# Patient Record
Sex: FEMALE | Race: OTHER | HISPANIC OR LATINO | URBAN - METROPOLITAN AREA
[De-identification: names, ages, dates, MRNs, and addresses within clinical notes are randomized per-mention and may not be internally consistent; named-entity substitution may affect disease eponyms.]

---

## 2021-08-10 ENCOUNTER — INPATIENT (INPATIENT)
Facility: HOSPITAL | Age: 31
LOS: 8 days | Discharge: HOME | DRG: 885 | End: 2021-08-19
Attending: PSYCHIATRY & NEUROLOGY | Admitting: PSYCHIATRY & NEUROLOGY
Payer: COMMERCIAL

## 2021-08-10 VITALS
RESPIRATION RATE: 18 BRPM | HEIGHT: 65 IN | TEMPERATURE: 98 F | SYSTOLIC BLOOD PRESSURE: 119 MMHG | DIASTOLIC BLOOD PRESSURE: 82 MMHG | OXYGEN SATURATION: 97 % | HEART RATE: 99 BPM | WEIGHT: 169.98 LBS

## 2021-08-10 DIAGNOSIS — F23 BRIEF PSYCHOTIC DISORDER: ICD-10-CM

## 2021-08-10 LAB
AMPHET UR-MCNC: NEGATIVE — SIGNIFICANT CHANGE UP
ANION GAP SERPL CALC-SCNC: 7 MMOL/L — SIGNIFICANT CHANGE UP (ref 5–17)
APAP SERPL-MCNC: <5 UG/ML — LOW (ref 10–30)
APPEARANCE UR: CLEAR — SIGNIFICANT CHANGE UP
BACTERIA # UR AUTO: PRESENT /HPF
BARBITURATES UR SCN-MCNC: NEGATIVE — SIGNIFICANT CHANGE UP
BASOPHILS # BLD AUTO: 0.04 K/UL — SIGNIFICANT CHANGE UP (ref 0–0.2)
BASOPHILS NFR BLD AUTO: 0.6 % — SIGNIFICANT CHANGE UP (ref 0–2)
BENZODIAZ UR-MCNC: NEGATIVE — SIGNIFICANT CHANGE UP
BILIRUB UR-MCNC: NEGATIVE — SIGNIFICANT CHANGE UP
BUN SERPL-MCNC: 6 MG/DL — LOW (ref 7–23)
CALCIUM SERPL-MCNC: 10 MG/DL — SIGNIFICANT CHANGE UP (ref 8.4–10.5)
CHLORIDE SERPL-SCNC: 101 MMOL/L — SIGNIFICANT CHANGE UP (ref 96–108)
CO2 SERPL-SCNC: 29 MMOL/L — SIGNIFICANT CHANGE UP (ref 22–31)
COCAINE METAB.OTHER UR-MCNC: NEGATIVE — SIGNIFICANT CHANGE UP
COLOR SPEC: YELLOW — SIGNIFICANT CHANGE UP
COVID-19 SPIKE DOMAIN AB INTERP: POSITIVE
COVID-19 SPIKE DOMAIN ANTIBODY RESULT: 213 U/ML — HIGH
CREAT SERPL-MCNC: 0.92 MG/DL — SIGNIFICANT CHANGE UP (ref 0.5–1.3)
DIFF PNL FLD: NEGATIVE — SIGNIFICANT CHANGE UP
EOSINOPHIL # BLD AUTO: 0.02 K/UL — SIGNIFICANT CHANGE UP (ref 0–0.5)
EOSINOPHIL NFR BLD AUTO: 0.3 % — SIGNIFICANT CHANGE UP (ref 0–6)
EPI CELLS # UR: ABNORMAL /HPF (ref 0–5)
ETHANOL SERPL-MCNC: <10 MG/DL — SIGNIFICANT CHANGE UP (ref 0–10)
GLUCOSE SERPL-MCNC: 109 MG/DL — HIGH (ref 70–99)
GLUCOSE UR QL: NEGATIVE — SIGNIFICANT CHANGE UP
HCT VFR BLD CALC: 44.5 % — SIGNIFICANT CHANGE UP (ref 34.5–45)
HGB BLD-MCNC: 15 G/DL — SIGNIFICANT CHANGE UP (ref 11.5–15.5)
IMM GRANULOCYTES NFR BLD AUTO: 0.1 % — SIGNIFICANT CHANGE UP (ref 0–1.5)
KETONES UR-MCNC: NEGATIVE — SIGNIFICANT CHANGE UP
LEUKOCYTE ESTERASE UR-ACNC: ABNORMAL
LYMPHOCYTES # BLD AUTO: 1.54 K/UL — SIGNIFICANT CHANGE UP (ref 1–3.3)
LYMPHOCYTES # BLD AUTO: 22.3 % — SIGNIFICANT CHANGE UP (ref 13–44)
MCHC RBC-ENTMCNC: 30.3 PG — SIGNIFICANT CHANGE UP (ref 27–34)
MCHC RBC-ENTMCNC: 33.7 GM/DL — SIGNIFICANT CHANGE UP (ref 32–36)
MCV RBC AUTO: 89.9 FL — SIGNIFICANT CHANGE UP (ref 80–100)
METHADONE UR-MCNC: NEGATIVE — SIGNIFICANT CHANGE UP
MONOCYTES # BLD AUTO: 0.68 K/UL — SIGNIFICANT CHANGE UP (ref 0–0.9)
MONOCYTES NFR BLD AUTO: 9.9 % — SIGNIFICANT CHANGE UP (ref 2–14)
NEUTROPHILS # BLD AUTO: 4.61 K/UL — SIGNIFICANT CHANGE UP (ref 1.8–7.4)
NEUTROPHILS NFR BLD AUTO: 66.8 % — SIGNIFICANT CHANGE UP (ref 43–77)
NITRITE UR-MCNC: NEGATIVE — SIGNIFICANT CHANGE UP
NRBC # BLD: 0 /100 WBCS — SIGNIFICANT CHANGE UP (ref 0–0)
OPIATES UR-MCNC: NEGATIVE — SIGNIFICANT CHANGE UP
PCP SPEC-MCNC: SIGNIFICANT CHANGE UP
PCP UR-MCNC: NEGATIVE — SIGNIFICANT CHANGE UP
PH UR: 6 — SIGNIFICANT CHANGE UP (ref 5–8)
PLATELET # BLD AUTO: 325 K/UL — SIGNIFICANT CHANGE UP (ref 150–400)
POTASSIUM SERPL-MCNC: 4.5 MMOL/L — SIGNIFICANT CHANGE UP (ref 3.5–5.3)
POTASSIUM SERPL-SCNC: 4.5 MMOL/L — SIGNIFICANT CHANGE UP (ref 3.5–5.3)
PROT UR-MCNC: NEGATIVE MG/DL — SIGNIFICANT CHANGE UP
RBC # BLD: 4.95 M/UL — SIGNIFICANT CHANGE UP (ref 3.8–5.2)
RBC # FLD: 12.2 % — SIGNIFICANT CHANGE UP (ref 10.3–14.5)
RBC CASTS # UR COMP ASSIST: < 5 /HPF — SIGNIFICANT CHANGE UP
SALICYLATES SERPL-MCNC: <0.3 MG/DL — LOW (ref 2.8–20)
SARS-COV-2 IGG+IGM SERPL QL IA: 213 U/ML — HIGH
SARS-COV-2 IGG+IGM SERPL QL IA: POSITIVE
SARS-COV-2 RNA SPEC QL NAA+PROBE: SIGNIFICANT CHANGE UP
SODIUM SERPL-SCNC: 137 MMOL/L — SIGNIFICANT CHANGE UP (ref 135–145)
SP GR SPEC: 1.01 — SIGNIFICANT CHANGE UP (ref 1–1.03)
THC UR QL: NEGATIVE — SIGNIFICANT CHANGE UP
UROBILINOGEN FLD QL: 0.2 E.U./DL — SIGNIFICANT CHANGE UP
WBC # BLD: 6.9 K/UL — SIGNIFICANT CHANGE UP (ref 3.8–10.5)
WBC # FLD AUTO: 6.9 K/UL — SIGNIFICANT CHANGE UP (ref 3.8–10.5)
WBC UR QL: < 5 /HPF — SIGNIFICANT CHANGE UP

## 2021-08-10 PROCEDURE — 93010 ELECTROCARDIOGRAM REPORT: CPT | Mod: NC

## 2021-08-10 PROCEDURE — 90792 PSYCH DIAG EVAL W/MED SRVCS: CPT

## 2021-08-10 PROCEDURE — 99285 EMERGENCY DEPT VISIT HI MDM: CPT

## 2021-08-10 RX ORDER — HALOPERIDOL DECANOATE 100 MG/ML
2 INJECTION INTRAMUSCULAR EVERY 6 HOURS
Refills: 0 | Status: DISCONTINUED | OUTPATIENT
Start: 2021-08-10 | End: 2021-08-19

## 2021-08-10 RX ADMIN — Medication 1 MILLIGRAM(S): at 10:59

## 2021-08-10 RX ADMIN — Medication 2 MILLIGRAM(S): at 18:13

## 2021-08-10 NOTE — ED PROVIDER NOTE - CLINICAL SUMMARY MEDICAL DECISION MAKING FREE TEXT BOX
32 y/o f with recent diagnosis of ?psychotic disorder presents with increased agitation, having adverse side effects of her medications.  No SI/HI in ED, medical w/u negative, seen by psych who will admit pending bed assignment.

## 2021-08-10 NOTE — ED BEHAVIORAL HEALTH ASSESSMENT NOTE - DESCRIPTION
patient was given 1mg lorazepam for agitation restlessness with good response none lives with significant other. no illicit drugs. no tobacco use. occasional alcohol use.

## 2021-08-10 NOTE — ED ADULT NURSE NOTE - OBJECTIVE STATEMENT
32 y/o female presents to ED for psychiatric evaluation - reports she has history of 30 y/o female presents to ED for psychiatric evaluation - reports she has history of psychotic episode with admission to [facility] 1 month ago. Started on risperidone, had dose increased which resulted in increased prolactin levels and nausea. Following a dose decrease and multiple medication changes, states she is experiencing insomnia, restlessness, decreased appetite, and having suicidal thoughts this AM. Denies hallucinations, delusions, HI, or illicit substance use. Placed on constant observation on arrival, belongings secured, environment checked for safety.

## 2021-08-10 NOTE — ED BEHAVIORAL HEALTH ASSESSMENT NOTE - OTHER
significant other/girlfriend resident housing significant other/girlfriend and point of contact -Joi Ortega [phone] not assessed

## 2021-08-10 NOTE — ED BEHAVIORAL HEALTH ASSESSMENT NOTE - HPI (INCLUDE ILLNESS QUALITY, SEVERITY, DURATION, TIMING, CONTEXT, MODIFYING FACTORS, ASSOCIATED SIGNS AND SYMPTOMS)
Patient is a 30 yo F w/ no known PMH presenting with cc of agitation, insomnia, SI. Patient was recently admitted to [facility] after an acute psychotic episode. Patient states shes recently [place] the [place] residency at another known hospital in [facility]. relocating to a foreign country and starting residency was a challenge for her adjusting. however 1 month ago she became increasingly overwhelmed after dealing with the death of a critical patient. shortly after, she began to have vivid dreams reliving the event, insomnia, paranoia, hallucinations (visual and auditory), SI (without intent or plan)/no HI. Patients spouse reports episodes of ryres walking the apartment complex where they live knowing on neighbors doors with paranoid hallucinations. Patient was voluntarily admitted at [facility] 1 month ago for acute psychotic episode and initially treated with Risperidone 3mg qd which she says helped. however patient began to develop side effects including lethargy, hyperprolactinemia, and nausea. during f/u at [facility] she decreased from risperidone 3mg qd to 2mg qd, then switched to aripiprazole 5mg qd followed by Ziprasidone 20mg qd.     today patient is presenting to the hospital because she feels her symptoms are not under control. she felt risperidone helped but after switched to these new medications she is starting to feel as if shes going to have another psychotic event. shes endorsing, agitation, insomnia, restlessness (including pacing and signs of akinesia). shes endorsing vivid dreams. denies hallucination (visual, auditory, or tactile), denies racing thoughts. endorses SI (with thoughts of jumping out of a window) however states she would never act on it, loves herself, her spouse and loved ones.     Patient is being voluntarily admitted for acute psychotic episode and medication adjustment. patient is expressing interest in being transferred back [facility]. however she is willing to stay and be treat here at [facility] as well. Patient is a 30 yo F w/ no known PMH presenting with cc of agitation, insomnia, SI. Patient was recently admitted to [facility] after an acute psychotic episode. Patient states shes recently [place] for [place] at another known hospital in [facility]. relocating to a foreign country and starting residency was a challenge for her adjusting. however 1 month ago she became increasingly overwhelmed after dealing with the death of a critical patient. shortly after, she began to have vivid dreams reliving the event, insomnia, paranoia, hallucinations (visual and auditory), SI (without intent or plan)/no HI. Patients spouse reports episodes of Ryres walking the apartment complex where they live knocking on neighbors doors with paranoid hallucinations. Patient was voluntarily admitted at [facility] 1 month ago for acute psychotic episode and initially treated with Risperidone 3mg qd which she says helped. however patient began to develop side effects including lethargy, hyperprolactinemia, and nausea. during f/u at [facility] she decreased from risperidone 3mg qd to 2mg qd, then switched to aripiprazole 5mg qd followed by Ziprasidone 20mg qd.     today patient is presenting to the hospital because she feels her symptoms are not under control. she felt risperidone helped but after switched to these new medications she is starting to feel as if shes going to have another psychotic event. shes endorsing, agitation, insomnia, restlessness (including pacing and signs of akinesia). shes endorsing vivid dreams. denies hallucination (visual, auditory, or tactile), denies racing thoughts. endorses SI (with thoughts of jumping out of a window) however states she would never act on it, loves herself, her spouse and loved ones.     Patient is being voluntarily admitted for acute psychotic episode and medication adjustment. patient is expressing interest in being transferred back to [facility]. however she is willing to stay and be treat here at [facility] as well. Patient is a 30 yo F w/ no known PMH presenting with cc of agitation, insomnia, SI. Patient was recently admitted to [facility] after an acute psychotic episode. Patient states shes recently [place] to the [place] for residency at another known hospital in [facility]. relocating to a foreign country and starting residency was a challenge for her adjusting. however 1 month ago she became increasingly overwhelmed after dealing with the death of a critical patient. shortly after, she began to have vivid dreams reliving the event, insomnia, paranoia, hallucinations (visual and auditory), SI (without intent or plan)/no HI. Patients spouse reports episodes of Reyes walking the apartment complex where they live knocking on neighbors doors with paranoid hallucinations. Patient was voluntarily admitted at [facility] 1 month ago for acute psychotic episode and initially treated with Risperidone 3mg qd which she says helped. however patient began to develop side effects including lethargy, hyperprolactinemia, and nausea. during f/u at [facility] she decreased from risperidone 3mg qd to 2mg qd, then switched to aripiprazole 5mg qd followed by Ziprasidone 20mg qd.     today patient is presenting to the hospital because she feels her symptoms are not under control. she felt risperidone helped but after switched to these new medications she is starting to feel as if shes going to have another psychotic event. shes endorsing, agitation, insomnia, restlessness (including pacing and signs of akinesia). shes endorsing vivid dreams. denies hallucination (visual, auditory, or tactile), denies racing thoughts. endorses SI (with thoughts of jumping out of a window) however states she would never act on it, loves herself, her spouse and loved ones.     Patient is being voluntarily admitted for acute psychotic episode and medication adjustment. patient is expressing interest in being transferred back to [facility]. however she is willing to stay and be treat here at [facility] as well.

## 2021-08-10 NOTE — ED BEHAVIORAL HEALTH ASSESSMENT NOTE - CASE SUMMARY
Patient is a 31 Patient is a 32 y/o woman with PPH of a brief psychotic episode, dg 1 month ago that requires inpatient psychiatric admission, triggered by stress related to watching a critical care pt dying, currently presenting with reoccurrence of symptoms (insomnia, restlessness vs akathisia, low mood, passive SI, nightmares, flashbacks, no AVH/PI) in context of titration from risperidone to low dosage aripiprazole and later on ziprazidone. Patient is requesting a voluntary admission for optimizing her treatment. Patient is a 32 y/o woman with PPH of a brief psychotic episode, dg 1 month ago that required inpatient psychiatric admission, triggered by stress related to watching a critical care pt dying, currently presenting with reoccurrence of symptoms (insomnia, restlessness vs akathisia, low mood, passive SI, nightmares, flashbacks, no AVH/PI) in context of titration from risperidone to low dosage aripiprazole and later on ziprazidone. Patient is requesting a voluntary admission for optimizing her treatment.

## 2021-08-10 NOTE — ED PROVIDER NOTE - ATTENDING CONTRIBUTION TO CARE
32 yo F IM resident [place] [facility] [place] pmh had first psychotic break 1 mo ago, admitted to [facility] started on Risperdal and was doing well now with persistent agitation and restlessness, insomnia, despite switching medications.  NO SI or HI, no hallucinations.  Pt restless, nad, aox3, otherwise nl exam, no SI/HI.  VSS.  Plan labs, TSH, upreg, psych consult and reassess.

## 2021-08-10 NOTE — ED PROVIDER NOTE - OBJECTIVE STATEMENT
30 y/o f hx new onset psychosis about 1 month ago presents c/o agitation, restlessness and decreased sleep over the past few weeks.  Pt was admitted to [facility] at that time, initially started on risperidone which had been helping.  Pt was d/c on risperidone and then began having increased agitation, nausea, was switched last week to aripiprazole which she took for a few days and had adverse side effects including bladder incontinence and was switched yesterday to ziprazidone which she took once and is feeling worse.  Pt stating she can't continue living like this but doesn't want to end her life, wants to see psych.  Denies SI/HI, AH/VH, all other ROS negative.

## 2021-08-10 NOTE — ED ADULT NURSE NOTE - CHIEF COMPLAINT QUOTE
Requesting psych evaluation and endorses SI without plan. States she had a "psychotic break" about a month ago with hallucinations, agitation, poor sleep, "mind racing," was admitted to [facility] for evaluation and management, and since the break has had thoughts of harming herself without plan. Reports her medications for management have since changed since her admission to [facility] (phone consults), and is requesting to be evaluated in person due to worsening agitation and restlessness. Denies HI. 1:1 initiated in triage.

## 2021-08-10 NOTE — ED ADULT NURSE REASSESSMENT NOTE - NS ED NURSE REASSESS COMMENT FT1
Patient a/oX3, anxious, c/o of feeling nervous, Ativan 2mg PO adminsitered prn dose.  No SI, HI, hallucinations, no pain complaint.  1:1 sitter ongoing.  Vital signs stable.  For admit 8 [facility] ; endorsement report received by ANNA Huertas.  Transferred to 8 uris at this time in stable condition.

## 2021-08-10 NOTE — ED BEHAVIORAL HEALTH ASSESSMENT NOTE - DETAILS
discussed details with patient n/a [facility] previously admitted for acute psychotic episode lethargy, hyperprolactinemia, nausea bipolar-mother d/w attending deferred

## 2021-08-10 NOTE — ED BEHAVIORAL HEALTH ASSESSMENT NOTE - SUMMARY
Patient is a 32 yo F w/ no known PMH presenting with cc of agitation, insomnia, SI recently admitted to [facility] for acute psychotic episode, being voluntarily readmitted for medication adjustment. Patient is a 32 yo F w/ no known PMH presenting with cc of agitation, insomnia, SI recently admitted to [facility] for acute psychotic episode, being voluntarily readmitted for medication adjustment.  Plan:  -admit to inpatient psychiatry, voluntary status  -prns for agitation with haldol 2mg poq6h with lorazepam 2mg q6h Patient is a 32 yo F w/ no known PMH presenting with cc of agitation, insomnia, SI recently admitted to [facility] for acute psychotic episode, being voluntarily readmitted for medication adjustment.  Plan:  -admit to inpatient psychiatry, voluntary status  -hold ziprazidone (may have caused/worsen akathesia); defer to the primary team starting standing medications  -prns for agitation with haldol 2mg poq6h with lorazepam 2mg q6h

## 2021-08-10 NOTE — ED BEHAVIORAL HEALTH ASSESSMENT NOTE - RISK ASSESSMENT
Moderate Acute Suicide Risk moderate acute risk: patient reports passive SI however states that she does not intent to act on them; patient's relationship with her wife is a protective factor. Patient has a good support system, she is motivated for treatment and  had a good therapeutic relationship with her prior inpatient psychiatric team.

## 2021-08-10 NOTE — ED BEHAVIORAL HEALTH ASSESSMENT NOTE - PAST PSYCHOTROPIC MEDICATION
(started 1 month ago) Risperidone 3mg qd to 2mg qd, then switched to aripiprazole 5mg qd followed by Ziprasidone 20mg qd.

## 2021-08-11 LAB
CHOLEST SERPL-MCNC: 146 MG/DL — SIGNIFICANT CHANGE UP
HDLC SERPL-MCNC: 57 MG/DL — SIGNIFICANT CHANGE UP
LIPID PNL WITH DIRECT LDL SERPL: 78 MG/DL — SIGNIFICANT CHANGE UP
NON HDL CHOLESTEROL: 89 MG/DL — SIGNIFICANT CHANGE UP
TRIGL SERPL-MCNC: 55 MG/DL — SIGNIFICANT CHANGE UP

## 2021-08-11 PROCEDURE — 99223 1ST HOSP IP/OBS HIGH 75: CPT

## 2021-08-11 RX ORDER — OLANZAPINE 15 MG/1
5 TABLET, FILM COATED ORAL AT BEDTIME
Refills: 0 | Status: DISCONTINUED | OUTPATIENT
Start: 2021-08-11 | End: 2021-08-16

## 2021-08-11 RX ORDER — TRAZODONE HCL 50 MG
50 TABLET ORAL AT BEDTIME
Refills: 0 | Status: DISCONTINUED | OUTPATIENT
Start: 2021-08-11 | End: 2021-08-19

## 2021-08-11 RX ORDER — ACETAMINOPHEN 500 MG
650 TABLET ORAL EVERY 6 HOURS
Refills: 0 | Status: DISCONTINUED | OUTPATIENT
Start: 2021-08-11 | End: 2021-08-19

## 2021-08-11 RX ORDER — PANTOPRAZOLE SODIUM 20 MG/1
40 TABLET, DELAYED RELEASE ORAL
Refills: 0 | Status: DISCONTINUED | OUTPATIENT
Start: 2021-08-11 | End: 2021-08-19

## 2021-08-11 RX ADMIN — Medication 2 MILLIGRAM(S): at 14:19

## 2021-08-11 RX ADMIN — OLANZAPINE 5 MILLIGRAM(S): 15 TABLET, FILM COATED ORAL at 22:05

## 2021-08-11 RX ADMIN — HALOPERIDOL DECANOATE 0.5 MILLIGRAM(S): 100 INJECTION INTRAMUSCULAR at 09:58

## 2021-08-11 NOTE — BEHAVIORAL HEALTH ASSESSMENT NOTE - RISK ASSESSMENT
Moderate Acute Suicide Risk moderate acute risk: patient reports passive SI however states that she does not intent to act on them; patient's relationship with her wife is a protective factor. Patient has a good support system, she is motivated for treatment and  had a good therapeutic relationship with her prior inpatient psychiatric team. Low Acute Suicide Risk

## 2021-08-11 NOTE — BEHAVIORAL HEALTH ASSESSMENT NOTE - NSBHCHARTREVIEWLAB_PSY_A_CORE FT
Lipid Profile (08.11.21 @ 08:36)   Cholesterol, Serum: 146 mg/dL   Triglycerides, Serum: 55 mg/dL   HDL Cholesterol, Serum: 57 mg/dL   Non HDL Cholesterol: 89: Patients Atherosclerotic Cardiovascular Disease (ASCVD) Risk   Optimal Level (mg/dL)   LDL Cholesterol (LDL-C)   All Patients < 100   ASCVD at Very High Risk{1} < 70   Non-HDL Cholesterol (Non-HDL-C)   All Patients < 130   ASCVD at Very High Risk{1} < 100   Non-HDL-Cholesterol (Non-HDL-C) is also a key target for cardiovascular   risk reduction.   Consider Familial Hypercholesterolemia when: LDL-C > 190 mg/dL or   Non-HDL-C > 220 mg/dL.   LDL-C calculation using the Friedewald equation is not provided when   triglycerides > 400 mg/dL, in which case we recommend repeating the test   after fasting, if it was not done before.   When triglycerides >150 mg/dL, calculated LDL-C is provided but maystill   be inaccurate (particularly when LDL-C < 70 mg/dL). It can be   recalculated off-line using other equations (e.g. Jared SS, Yaima GIBSON,   Ysabel WORKMAN, et al.ADAM 2013;310:2061 - 8).   {1} Hussein Dela.,et al. "2019 AHA/ACC. . . guideline on the   management of blood cholesterol: a report of the American College of   Cardiology/American Heart Association Task Force on Clinical Practice   Guidelines." Circulation;139:e1082 - e1143.   These values apply only to persons 20 years and older.   Lipid Panel updated with new test, reference ranges and interpretive   comments effective 10-. mg/dL   LDL Cholesterol Calculated: 78 mg/dL

## 2021-08-11 NOTE — BEHAVIORAL HEALTH ASSESSMENT NOTE - NSBHADMITIPSTRENGTH_PSY_A_CORE
Motivated and ready for change/In good physical health/Able to set and pursue goals/Has vocational interests or hobbies/Has access to housing/residential stability/Steady employment/Knowledge of medications

## 2021-08-11 NOTE — BEHAVIORAL HEALTH ASSESSMENT NOTE - NSBHADMITCOUNSEL_PSY_A_CORE
diagnostic results/impressions and/or recommended studies/risks and benefits of treatment options/prognosis

## 2021-08-11 NOTE — BEHAVIORAL HEALTH ASSESSMENT NOTE - OTHER
After Visit Summary   9/13/2017    Dre Chang    MRN: [id number]           Patient Information     Date Of Birth          1953        Visit Information        Provider Department      9/13/2017 1:00 PM Bo Wood PA-C [facility] HEART [facility]        Today's Diagnoses     Coronary artery disease of native artery of native heart with stable angina pectoris (H)    -  1    Abnormal stress test          Care Instructions    1.  You are scheduled for the angiogram on 9/20/17 with Dr. Choe. We will preadmit you for IV fluids. We can do the labs the day of the procedure since you are going in early.   2.  Continue aspirin, Toprol, and Crestor.   3.  If any chest pain, you can use the nitro. If it recurs, or you have prolonged episodes, please be seen urgently.             Follow-ups after your visit        Your next 10 appointments already scheduled     Sep 20, 2017 11:00 AM CDT   Heart Cath Left Heart Cath with SHCVR2   [facility] Cardiac [facility]atheter[facility] ([facility])    [address]  [place] [address] [phone]              Who to contact     If you have questions or need follow up information about today's clinic visit or your schedule please contact [facility] directly at [phone].  Normal or non-critical lab and imaging results will be communicated to you by MyChart, letter or phone within 4 business days after the clinic has received the results. If you do not hear from us within 7 days, please contact the clinic through MyChart or phone. If you have a critical or abnormal lab result, we will notify you by phone as soon as possible.  Submit refill requests through [employer] or call your pharmacy and they will forward the refill request to us. Please allow 3 business days for your refill to be completed.          Additional Information About Your Visit        [facility] Qu[facility]  Information     [employer] gives you secure access to your electronic health record. If you see a primary care provider, you can also send messages to your care team and make appointments. If you have questions, please call your primary care clinic.  If you do not have a primary care provider, please call [phone] and they will assist you.        Care EveryWhere ID     This is your Care EveryWhere ID. This could be used by other organizations to access your [facility] medical records  [id number]        Your Vitals Were     Pulse Height BMI (Body Mass Index)             68 1.829 m (6') 35.19 kg/m2          Blood Pressure from Last 3 Encounters:   09/13/17 116/72   08/29/17 120/76   08/03/17 106/70    Weight from Last 3 Encounters:   09/13/17 117.7 kg (259 lb 8 oz)   08/29/17 118.1 kg (260 lb 6.4 oz)   08/03/17 120.7 kg (266 lb)              We Performed the Following     Follow-Up with Cardiac Advanced Practice Provider          Today's Medication Changes          These changes are accurate as of: 9/13/17  1:50 PM.  If you have any questions, ask your nurse or doctor.               These medicines have changed or have updated prescriptions.        Dose/Directions    esomeprazole 40 MG CR capsule   Commonly known as:  nexIUM   This may have changed:    - how much to take  - when to take this  - additional instructions   Used for:  Gastroesophageal reflux disease without esophagitis        Dose:  40 mg   Take 1 capsule (40 mg) by mouth every morning (before breakfast) Take 30-60 minutes before eating.   Quantity:  90 capsule   Refills:  3       ranitidine 300 MG tablet   Commonly known as:  ZANTAC   This may have changed:  when to take this   Used for:  Gastroesophageal reflux disease without esophagitis        Dose:  300 mg   Take 1 tablet (300 mg) by mouth At Bedtime   Quantity:  30 tablet   Refills:  1            Where to get your medicines      These medications were sent to [facility] [address] [place] [address]  [place] - [address] [place] [address]  [address] [address]     Phone:  [phone]     nitroGLYcerin 0.4 MG sublingual tablet                Primary Care Provider Office Phone # Fax #    Dallas Barfield [phone]       [address]  [facility] [address]        Equal Access to Services     [facility]: Hadii aad ku hadasho Soomaali, waaxda luqadaha, qaybta kaalmada adeegyada, waxay idiin hayaan adeeg kharash la'aan ah. So [facility] [phone].    ATENCIÓN: Si habla español, tiene a mcgee disposición servicios gratuitos de asistencia lingüística. Veroame al [phone].    We comply with applicable federal civil rights laws and [place] laws. We do not discriminate on the basis of race, color, national origin, age, disability sex, sexual orientation or gender identity.            Thank you!     Thank you for choosing [facility] AT [place]  for your care. Our goal is always to provide you with excellent care. Hearing back from our patients is one way we can continue to improve our services. Please take a few minutes to complete the written survey that you may receive in the mail after your visit with us. Thank you!             Your Updated Medication List - Protect others around you: Learn how to safely use, store and throw away your medicines at www.disposemymeds.org.          This list is accurate as of: 9/13/17  1:50 PM.  Always use your most recent med list.                   Brand Name Dispense Instructions for use Diagnosis    ASPIRIN PO      Take 81 mg by mouth daily        calcium carbonate 1250 MG tablet    OS-BARBARA 500 mg [place]. Ca     Take 500 mg by mouth daily        cyanocobalamin 1000 MCG/ML injection    VITAMIN B12    10 mL    INJECT 1 ML (1000 MCG) INTO THE MUSCLE EVERY 30 DAYS    Vitamin B12 deficiency (non anemic)       esomeprazole 40 MG CR capsule    nexIUM    90 capsule    Take 1 capsule (40 mg) by mouth every morning (before breakfast)  Take 30-60 minutes before eating.    Gastroesophageal reflux disease without esophagitis       hydrochlorothiazide 25 MG tablet    HYDRODIURIL    90 tablet    Take 1 tablet (25 mg) by mouth daily    Essential hypertension, benign       levothyroxine 175 MCG tablet    SYNTHROID    90 tablet    Take 1 tablet (175 mcg) by mouth daily    Hypothyroidism due to medication       losartan 25 MG tablet    COZAAR    90 tablet    Take 1 tablet (25 mg) by mouth daily    Essential hypertension, benign       metoprolol 25 MG 24 hr tablet    TOPROL-XL    30 tablet    Take 1 tablet (25 mg) by mouth daily    Abnormal stress test       nitroGLYcerin 0.4 MG sublingual tablet    NITROSTAT    25 tablet    For chest pain place 1 tablet under the tongue every 5 minutes for 3 doses. If symptoms persist 5 minutes after 1st dose call 911.    Abnormal stress test       ranitidine 300 MG tablet    ZANTAC    30 tablet    Take 1 tablet (300 mg) by mouth At Bedtime    Gastroesophageal reflux disease without esophagitis       rosuvastatin 20 MG tablet    CRESTOR    30 tablet    Take 1 tablet (20 mg) by mouth daily    Abnormal stress test          n/a resident housing

## 2021-08-11 NOTE — BEHAVIORAL HEALTH ASSESSMENT NOTE - NSBHCHARTREVIEWVS_PSY_A_CORE FT
Vital Signs Last 24 Hrs  T(C): 36.5 (11 Aug 2021 09:29), Max: 36.7 (10 Aug 2021 17:54)  T(F): 97.7 (11 Aug 2021 09:29), Max: 98 (10 Aug 2021 17:54)  HR: 109 (11 Aug 2021 09:29) (98 - 109)  BP: 101/68 (11 Aug 2021 09:29) (101/68 - 115/77)  BP(mean): --  RR: 17 (10 Aug 2021 17:54) (17 - 17)  SpO2: 100% (11 Aug 2021 09:29) (100% - 100%)

## 2021-08-11 NOTE — BEHAVIORAL HEALTH ASSESSMENT NOTE - SUMMARY
Patient is a 32y/o , employed, domiciled  female with no pertinent medical history. Psychiatric history significant for 1 prior hospitalization recently at [facility] July 2021 following a brief psychotic episode, since then she has been in treatment with a psychiatrist at [facility] for med management. Not currently receiving therapy, though she would like to. No hx of suicide attempts, substance use or legal issues. Patient presents to ED bibEMS activated by herself for worsening agitation, akathisia and accompanying passive suicidal thoughts following medication adjustments. Patient is transferred to [facility] voluntary status for medication management. Patient is a 30y/o , employed, domiciled  female with no pertinent medical history. Psychiatric history significant for 1 prior hospitalization recently at [facility] July 2021 following a brief psychotic episode, since then she has been in treatment with a psychiatrist at [facility] for med management. Not currently receiving therapy, though she would like to. No hx of suicide attempts, substance use or legal issues. Patient presents to ED bibEMS activated by herself for worsening agitation, akathisia and accompanying passive suicidal thoughts following medication adjustments. Patient is transferred to [address] voluntary status for medication management.    Discussed medication options with patient including seroquel, haldol and zyprexa. Pt reported that her father (a physician) works closely with a Psychiatrist in  and recommends that pt pursues a trial of zyprexa. Pt is in agreement and would like a trial. Discussed s/e, a/e with pt who verbalized understanding as well as potential need for lifestyle changes with zyprexa. Zyprexa 5mg qhs to be started tonight. Pt also expressed desire to speak with psychologist during her hospitalization.

## 2021-08-11 NOTE — BEHAVIORAL HEALTH ASSESSMENT NOTE - REMOTE MEMORY
Please ask pt the need for medication. According to [facility] she is not she is not sure is she going to return or not?/ please varify PCp. Provide more info before approve refill. Thanks. Normal

## 2021-08-11 NOTE — BEHAVIORAL HEALTH ASSESSMENT NOTE - DETAILS
[facility] discussed details with patient bipolar-paternal aunt lethargy, hyperprolactinemia, nausea see hpi previously admitted for acute psychotic episode

## 2021-08-11 NOTE — BEHAVIORAL HEALTH ASSESSMENT NOTE - HPI (INCLUDE ILLNESS QUALITY, SEVERITY, DURATION, TIMING, CONTEXT, MODIFYING FACTORS, ASSOCIATED SIGNS AND SYMPTOMS)
Patient is a 32y/o , employed, domiciled  female with no pertinent medical history. Psychiatric history significant for 1 prior hospitalization recently at [facility] July 2021 following a brief psychotic episode, since then she has been in treatment with a psychiatrist at [facility] for med management. Not currently receiving therapy, though she would like to. No hx of suicide attempts, substance use or legal issues. Patient presents to ED bibEMS activated by herself for worsening agitation, akathisia and accompanying passive suicidal thoughts following medication adjustments. Patient is transferred to [facility] voluntary status for medication management.    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 She requested to leave a couple hours early one day to open a bank account and this was denied. After the loss of the patient she then had a panic attack with the PG2 and requested time off. (Pt notably tearful as she recounts this interaction) She states that her wife was called to pick her up at work one day because she was notably disorganized, she verbalized feeling that things around her were poisonous and felt that the PG2 was a 'terrorist.' Pt also began experiencing thought broadcasting feeling that the tv was talking to her as well as enacting her life in various episodes. She felt that her life declan was bought by [employer] and was being broadcasted by [employer]. She was taken to [facility] and admitted for a Brief Psychotic Episode, treated with Risperdal 3mg qhs and discharged after 10 days with aftercare at [facility]. She reported that Risperdal was effective and she felt much better at time of discharge however, she began experiencing nausea, blurry vision. Lab work indicated prolactinemia and her outpt psychiatrist decreased the Risperdal to 2mg. Pt however continued to have side effect of nausea and Risperdal was discontinued and Abilify 5mg implemented x 7 days. Pt then began to experience restlessness, vivid/distressing dreams and incontinence (thinks it may be due to anxiety) with Abilify. Abilify was then discontinued and the day prior to coming to hospital Geodon 20mg was initiated. Pt took one dose, but her side effects continued, making her increasingly uncomfortable to the point where she began having SI (no intent) to jump out of her apartment window to stop how badly she was feeling. She alerted ems and asked to be taken to a [facility].    Patient also reports worsening sleep after her discharge, decrease in appetite as well as ongoing akathisia. She did receive Ativan in ED after continually pacing throughout the day- which she found to be helpful. She denies any a/v h or paranoid ideations after her last hospitalizations. Denies current si/hi and is future oriented. She reports a hx of anxiety attacks >10 years ago prior to telling family that she was moore.    Denies any hx of maikel, elevated mood, goal directed behavior etc. Denies any hx of depressive symptoms.

## 2021-08-12 PROCEDURE — 99233 SBSQ HOSP IP/OBS HIGH 50: CPT

## 2021-08-12 RX ADMIN — PANTOPRAZOLE SODIUM 40 MILLIGRAM(S): 20 TABLET, DELAYED RELEASE ORAL at 07:23

## 2021-08-12 RX ADMIN — OLANZAPINE 5 MILLIGRAM(S): 15 TABLET, FILM COATED ORAL at 21:42

## 2021-08-13 PROCEDURE — 99233 SBSQ HOSP IP/OBS HIGH 50: CPT

## 2021-08-13 RX ADMIN — PANTOPRAZOLE SODIUM 40 MILLIGRAM(S): 20 TABLET, DELAYED RELEASE ORAL at 06:51

## 2021-08-13 RX ADMIN — OLANZAPINE 5 MILLIGRAM(S): 15 TABLET, FILM COATED ORAL at 21:42

## 2021-08-13 RX ADMIN — Medication 2 MILLIGRAM(S): at 17:50

## 2021-08-13 NOTE — PROGRESS NOTE BEHAVIORAL HEALTH - NSBHADMITCOUNSEL_PSY_A_CORE
diagnostic results/impressions and/or recommended studies/risks and benefits of treatment options/prognosis
diagnostic results/impressions and/or recommended studies/risks and benefits of treatment options/prognosis

## 2021-08-14 PROCEDURE — 99231 SBSQ HOSP IP/OBS SF/LOW 25: CPT

## 2021-08-14 RX ORDER — IBUPROFEN 200 MG
400 TABLET ORAL EVERY 6 HOURS
Refills: 0 | Status: DISCONTINUED | OUTPATIENT
Start: 2021-08-14 | End: 2021-08-19

## 2021-08-14 RX ADMIN — Medication 400 MILLIGRAM(S): at 14:20

## 2021-08-14 RX ADMIN — PANTOPRAZOLE SODIUM 40 MILLIGRAM(S): 20 TABLET, DELAYED RELEASE ORAL at 07:15

## 2021-08-14 RX ADMIN — OLANZAPINE 5 MILLIGRAM(S): 15 TABLET, FILM COATED ORAL at 21:46

## 2021-08-14 RX ADMIN — Medication 650 MILLIGRAM(S): at 06:04

## 2021-08-14 RX ADMIN — Medication 650 MILLIGRAM(S): at 05:20

## 2021-08-14 RX ADMIN — Medication 400 MILLIGRAM(S): at 22:36

## 2021-08-14 RX ADMIN — Medication 400 MILLIGRAM(S): at 13:20

## 2021-08-14 RX ADMIN — Medication 400 MILLIGRAM(S): at 21:46

## 2021-08-14 NOTE — PROGRESS NOTE BEHAVIORAL HEALTH - NSBHCHARTREVIEWVS_PSY_A_CORE FT
Vital Signs Last 24 Hrs  T(C): 36.4 (12 Aug 2021 08:45), Max: 36.4 (12 Aug 2021 08:45)  T(F): 97.6 (12 Aug 2021 08:45), Max: 97.6 (12 Aug 2021 08:45)  HR: 93 (12 Aug 2021 08:45) (93 - 93)  BP: 115/82 (12 Aug 2021 08:45) (114/75 - 115/82)  BP(mean): --  RR: 20 (12 Aug 2021 08:45) (18 - 20)  SpO2: 99% (12 Aug 2021 08:45) (99% - 100%)
Vital Signs Last 24 Hrs  T(C): 37.1 (13 Aug 2021 16:24), Max: 37.1 (13 Aug 2021 16:24)  T(F): 98.7 (13 Aug 2021 16:24), Max: 98.7 (13 Aug 2021 16:24)  HR: 76 (13 Aug 2021 16:24) (76 - 76)  BP: 106/69 (13 Aug 2021 16:24) (106/69 - 106/69)  BP(mean): --  RR: --  SpO2: 100% (13 Aug 2021 16:24) (100% - 100%)
Vital Signs Last 24 Hrs  T(C): 36.5 (13 Aug 2021 09:00), Max: 37 (12 Aug 2021 16:31)  T(F): 97.7 (13 Aug 2021 09:00), Max: 98.6 (12 Aug 2021 16:31)  HR: 87 (13 Aug 2021 09:00) (77 - 87)  BP: 105/70 (13 Aug 2021 09:00) (105/70 - 105/74)  BP(mean): --  RR: 17 (13 Aug 2021 09:00) (17 - 18)  SpO2: 98% (13 Aug 2021 09:00) (98% - 100%)

## 2021-08-14 NOTE — PROGRESS NOTE BEHAVIORAL HEALTH - NSBHATTESTSEENBY_PSY_A_CORE
NP without Attending Psychiatrist
NP without Attending Psychiatrist
attending Psychiatrist without NP/Trainee

## 2021-08-14 NOTE — PROGRESS NOTE BEHAVIORAL HEALTH - NSBHFUPINTERVALHXFT_PSY_A_CORE
Patient visible on the unit is seen attending groups throughout day, observed talking on the phone etc. Is seen today with Attending Dr. Landis. Pt continues to report some symptom improvement since admission, is tolerating zyprexa well with minimal daytime sedation. No other side effects/adverse reactions. Endorses that she continues to feel some agitation though much less compared to her presenting to hospital. Encouraged to use available prns as appropriate. She states that she feels hopeful for her future though expresses some concern if she will ever get back to her prior level of functioning. Denies si/hi, a/v h or paranoid ideation. No acute medical concerns. Sleep is improving though she endorse some 'anxiety and disorganization' right before she goes to sleep (hypnagogic hallucinations).
Patient visible on the unit, is wearing her own clothes, states that she went to a group this morning, but didn't think that it was for her. She states that she has noticed over the last several days before coming to the hospital that she didn't enjoy the things that used to like before ie; shopping, her hobbies. She wonders if she could be depressed. However denies sxs of depression. She denies si/hi, a/v h or paranoid ideation, states that appetite has returned. Sleep last night was fair, so far is tolerating zyprexa without issue. She verbalizes a desire to get back to her level of functioning before her 'episode'. Adjusting well to unit.  per staff report pt noted to be isolative yesterday and slept throughout the evening
Pt. seen, chart reviewed. Pt. seen individually, she reports her mood today as "good I guess." Pt. reports she slept "well" last PM. Pt. denies SI/HI/AH/VH. No side effects to medications reported or observed. Pt. reports menstrual pain and requests Motrin.

## 2021-08-14 NOTE — PROGRESS NOTE BEHAVIORAL HEALTH - NSBHCHARTREVIEWLAB_PSY_A_CORE FT
Lipid Profile (08.11.21 @ 08:36)   Cholesterol, Serum: 146 mg/dL   Triglycerides, Serum: 55 mg/dL   HDL Cholesterol, Serum: 57 mg/dL   Non HDL Cholesterol: 89: Patients Atherosclerotic Cardiovascular Disease (ASCVD) Risk   Optimal Level (mg/dL)   LDL Cholesterol (LDL-C)   All Patients < 100   ASCVD at Very High Risk{1} < 70   Non-HDL Cholesterol (Non-HDL-C)   All Patients < 130   ASCVD at Very High Risk{1} < 100   Non-HDL-Cholesterol (Non-HDL-C) is also a key target for cardiovascular   risk reduction.   Consider Familial Hypercholesterolemia when: LDL-C > 190 mg/dL or   Non-HDL-C > 220 mg/dL.   LDL-C calculation using the Friedewald equation is not provided when   triglycerides > 400 mg/dL, in which case we recommend repeating the test   after fasting, if it was not done before.   When triglycerides >150 mg/dL, calculated LDL-C is provided but maystill   be inaccurate (particularly when LDL-C < 70 mg/dL). It can be   recalculated off-line using other equations (e.g. Jared SS, Yaima GIBSON,   Ysabel WORKMAN, et al.ADAM 2013;310:2061 - 8).   {1} Hussein Deal.,et al. "2019 AHA/ACC. . . guideline on the   management of blood cholesterol: a report of the American College of   Cardiology/American Heart Association Task Force on Clinical Practice   Guidelines." Circulation;139:e1082 - e1143.   These values apply only to persons 20 years and older.   Lipid Panel updated with new test, reference ranges and interpretive   comments effective 10-. mg/dL   LDL Cholesterol Calculated: 78 mg/dL

## 2021-08-14 NOTE — PROGRESS NOTE BEHAVIORAL HEALTH - SUMMARY
Patient is a 32y/o , employed, domiciled  female with no pertinent medical history. Psychiatric history significant for 1 prior hospitalization recently at [facility] July 2021 following a brief psychotic episode, since then she has been in treatment with a psychiatrist at [facility] for med management. Not currently receiving therapy, though she would like to. No hx of suicide attempts, substance use or legal issues. Patient presents to ED bibEMS activated by herself for worsening agitation, akathisia and accompanying passive suicidal thoughts following medication adjustments. Patient is transferred to [facility] voluntary status for medication management.    Plan to continue Zyprexa 5mg qhs
per previous: "Patient is a 30y/o , employed, domiciled  female with no pertinent medical history. Psychiatric history significant for 1 prior hospitalization recently at [facility] July 2021 following a brief psychotic episode, since then she has been in treatment with a psychiatrist at [facility] for med management. Not currently receiving therapy, though she would like to. No hx of suicide attempts, substance use or legal issues. Patient presents to ED bibEMS activated by herself for worsening agitation, akathisia and accompanying passive suicidal thoughts following medication adjustments. Patient is transferred to [facility] voluntary status for medication management.    Plan to continue Zyprexa 5mg qhs"    8/14: Pt. appears to be responding well to current medications, no changes at this time. Motrin ordered for menstrual pain.
Patient is a 32y/o , employed, domiciled  female with no pertinent medical history. Psychiatric history significant for 1 prior hospitalization recently at [facility] July 2021 following a brief psychotic episode, since then she has been in treatment with a psychiatrist at [facility] for med management. Not currently receiving therapy, though she would like to. No hx of suicide attempts, substance use or legal issues. Patient presents to ED bibEMS activated by herself for worsening agitation, akathisia and accompanying passive suicidal thoughts following medication adjustments. Patient is transferred to [facility] voluntary status for medication management.    Plan to continue Zyprexa 5mg qhs

## 2021-08-15 RX ADMIN — Medication 2 MILLIGRAM(S): at 23:20

## 2021-08-15 RX ADMIN — PANTOPRAZOLE SODIUM 40 MILLIGRAM(S): 20 TABLET, DELAYED RELEASE ORAL at 06:54

## 2021-08-15 RX ADMIN — OLANZAPINE 5 MILLIGRAM(S): 15 TABLET, FILM COATED ORAL at 21:32

## 2021-08-16 PROCEDURE — 90834 PSYTX W PT 45 MINUTES: CPT

## 2021-08-16 PROCEDURE — 99232 SBSQ HOSP IP/OBS MODERATE 35: CPT

## 2021-08-16 RX ORDER — OLANZAPINE 15 MG/1
7.5 TABLET, FILM COATED ORAL AT BEDTIME
Refills: 0 | Status: DISCONTINUED | OUTPATIENT
Start: 2021-08-16 | End: 2021-08-17

## 2021-08-16 RX ADMIN — OLANZAPINE 7.5 MILLIGRAM(S): 15 TABLET, FILM COATED ORAL at 21:23

## 2021-08-16 RX ADMIN — PANTOPRAZOLE SODIUM 40 MILLIGRAM(S): 20 TABLET, DELAYED RELEASE ORAL at 06:51

## 2021-08-16 NOTE — BH INPATIENT PSYCHIATRY PROGRESS NOTE - NSBHMSEGROOM_PSY_A_CORE
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 He presents to ED with 1 day of wheezing, left sided chest pain, and dyspnea. This is worse than usual, as daughter states he does have "chronic wheezing" but now it appeared to be more persistent. In ED he a CT chest is c/w Moderate-large layering left pleural effusion as well as an approx 6.4 x 2.2cm density along the chain sutures within the left midlung may represent postsurgical changes. Of note he was recently seen at Dr. Heredia's office and was instructed to follow up with Dr. Noble in order to assess a back mass.  CT surgery consulted.     On bedside assessment patient is with family member, he is alert and oriented to self. He is lying flat comfortably with oxygen saturation of 100% on room air. He currently denies SOB at rest and feels comfortable, although there is some wheezing appreciated on exam. Family member states that he is much better now and his wheezing and sob seems better since coming to the hospital and getting nebulizer treatment. She admits that at home he was wheezing more than usual without much resolution after treatments.       Antiplatelet therapy: none    Past Medical History  Sarcoma  Dementia  CAD (coronary artery disease)  Glaucoma  Cataract  H/O: osteoarthritis  Direct inguinal hernia  History of short term memory loss  Incontinence of urine  Urinary urgency  History of BPH  Blood incompatibility with cadaveric donor  H/O: HTN (hypertension)  Diabetes mellitus type II      Past Surgical History  History of sarcoma: right chest wall -   Stenosis of coronary stent: x 3 last being   Prostate enlargement: S/P Green light laser ablation of prostate; 2012  h/o bilat cataract repair -  3 yrs ago  H/O arthroscopy of left knee -   Direct inguinal hernia repair - left - 20 yrs ago  sigmoid resection - 20 yrs ago  H/O coronary angioplasty and stent x 1 - 5 yrs ago        Vital Signs Last 24 Hrs  T(C): 37.2 (08 Mar 2019 15:32), Max: 37.2 (08 Mar 2019 12:46)  T(F): 98.9 (08 Mar 2019 15:32), Max: 99 (08 Mar 2019 12:46)  HR: 80 (08 Mar 2019 15:32) (68 - 101)  BP: 130/49 (08 Mar 2019 15:32) (124/43 - 142/56)  BP(mean): --  RR: 18 (08 Mar 2019 15:32) (18 - 22)  SpO2: 100% (08 Mar 2019 15:32) (97% - 100%)    MEDICATIONS  (STANDING):  acetaminophen   Tablet .. 650 milliGRAM(s) Oral once    MEDICATIONS  (PRN):                                Allergies: No Known Allergies      Social History:   Smoke: Former smoker  ETOH: Denies  Illicit Drug Use: Denies  Assist Device: Walker       Relevant Family History  FAMILY HISTORY:  Family history of lymphoma (Child, Sibling): sister alive , son   Family history of hypertension: mother, sister and brother  Family history of diabetes mellitus (Mother, Sibling): mother and brother        Review of Systems  GENERAL:  Fevers[] chills[] sweats[] fatigue[] weight loss[] weight gain []                                       [ x] NEGATIVE  NEURO:  parathesias[] seizures []  syncope []  confusion []                                                                 [ x] NEGATIVE                 EYES: glasses[]  blurry vision[]  discharge[] pain[] glaucoma []                                                           [x ] NEGATIVE                 ENMT:  difficulty hearing []  vertigo[]  dysphagia[] epistaxis[] recent dental work []                     [x ] NEGATIVE                 CV:  chest pain[] palpitations[] SCHULZ [x] diaphoresis [] edema[]                                                           [ ] NEGATIVE                                 RESPIRATORY:  wheezing[] SOB[x] cough [x] sputum[] hemoptysis[]                                                   [ ] NEGATIVE               GI:  nausea[]  vomiting []  diarrhea[] constipation [] melena []                                                          [x ] NEGATIVE            : hematuria[ ]  dysuria[ ] urgency[] incontinence[]                                                                         [ x] NEGATIVE                    MUSKULOSKELETAL:  arthritis[ ]  joint swelling [ ] muscle weakness [ ]                                           [x ] NEGATIVE                     SKIN/BREAST:  rash[ ] itching [ ]  hair loss[ ] masses[ ]                                                                         [ ]x NEGATIVE                     PSYCH:  dementia [ ] depression [ ] anxiety[ ]                                                                                        [ x] NEGATIVE                        HEME/LYMPH:  bruises easily[ ] enlarged lymph nodes[ ] tender lymph nodes[ ]                          [x] NEGATIVE                      ENDOCRINE:  cold intolerance[ ] heat intolerance[ ] polydipsia[ ]                                                     [ x] NEGATIVE                        Physical Exam:   General: Well nourished, well developed, no acute distress.                                                         Neuro: Normal exam oriented to person/place & time with no focal motor or sensory  deficits.                    Eyes: Normal exam of conjunctiva & lids, pupils equally reactive.   ENT: Normal exam of nasal/oral mucosa with absence of cyanosis.   Neck: Normal exam of jugular veins, trachea & thyroid.   Chest: Notable wheezing to bilateral lung fields, no rales or rhochi                                                                         CV:  Regular rate and rhythm, without gallops, clicks or murmurs, no cartotid bruits noted.                                                                GI: Normal exam of abdomen, liver & spleen with no noted masses or tenderness.                                                                                              Extremities: Normal no evidence of cyanosis or deformity, without edema.  Lower Extremities: Positive bilateral LE pulses, without varicosities  SKIN : Normal exam to inspection & palpation.                                                           Labs:                         11.3   6.15  )-----------( 260      ( 08 Mar 2019 09:11 )             36.2     03    141  |  106  |  23  ----------------------------<  85  5.0   |  26  |  1.35<H>    Ca    9.9      08 Mar 2019 09:11    TPro  7.2  /  Alb  3.7  /  TBili  0.2  /  DBili  x   /  AST  13  /  ALT  18  /  AlkPhos  119  03-08    PT/INR - ( 08 Mar 2019 09:11 )   PT: 13.1 SEC;   INR: 1.14          PTT - ( 08 Mar 2019 09:11 )  PTT:30.7 SEC        CT chest:     < from: CT Angio Chest w/ IV Cont (19 @ 12:27) >    IMPRESSION:     1.  No pulmonary embolus. No aortic aneurysm or dissection.    2.  Moderate-large layering left pleural effusion and subsegmental   atelectasis of left lower lobe.    3.  Status post interval left upper lobe wedge resection.    4.  Approximately 6.4 x 2.2cm density along the chain sutures within the   left midlung may represent postsurgical changes. Recommend follow-up per   oncology protocol.      < end of copied text > Fair

## 2021-08-17 PROCEDURE — 90834 PSYTX W PT 45 MINUTES: CPT

## 2021-08-17 PROCEDURE — 99232 SBSQ HOSP IP/OBS MODERATE 35: CPT

## 2021-08-17 RX ORDER — SERTRALINE 25 MG/1
25 TABLET, FILM COATED ORAL DAILY
Refills: 0 | Status: DISCONTINUED | OUTPATIENT
Start: 2021-08-17 | End: 2021-08-18

## 2021-08-17 RX ORDER — OLANZAPINE 15 MG/1
10 TABLET, FILM COATED ORAL AT BEDTIME
Refills: 0 | Status: DISCONTINUED | OUTPATIENT
Start: 2021-08-17 | End: 2021-08-19

## 2021-08-17 RX ADMIN — PANTOPRAZOLE SODIUM 40 MILLIGRAM(S): 20 TABLET, DELAYED RELEASE ORAL at 10:13

## 2021-08-17 RX ADMIN — Medication 2 MILLIGRAM(S): at 22:11

## 2021-08-17 RX ADMIN — SERTRALINE 25 MILLIGRAM(S): 25 TABLET, FILM COATED ORAL at 12:46

## 2021-08-17 RX ADMIN — OLANZAPINE 10 MILLIGRAM(S): 15 TABLET, FILM COATED ORAL at 21:07

## 2021-08-17 NOTE — BH PATIENT PROFILE - NSDASAIMPULSIVITY_PSY_ALL_CORE
Prescription was sent in yesterday.  Patient called and informed.  When I called her to let her know she said she had just gotten notification from the pharmacy that her prescription was ready.  Johanna Lu,      No

## 2021-08-17 NOTE — BH INPATIENT PSYCHIATRY PROGRESS NOTE - NSBHADMITCOUNSEL_PSY_A_CORE
risks and benefits of treatment options/instructions for management, treatment and follow up/risk factor reduction/prognosis

## 2021-08-18 DIAGNOSIS — F32.81 PREMENSTRUAL DYSPHORIC DISORDER: ICD-10-CM

## 2021-08-18 PROCEDURE — 90832 PSYTX W PT 30 MINUTES: CPT

## 2021-08-18 PROCEDURE — 99232 SBSQ HOSP IP/OBS MODERATE 35: CPT

## 2021-08-18 RX ORDER — SERTRALINE 25 MG/1
50 TABLET, FILM COATED ORAL DAILY
Refills: 0 | Status: DISCONTINUED | OUTPATIENT
Start: 2021-08-19 | End: 2021-08-19

## 2021-08-18 RX ORDER — MIRTAZAPINE 45 MG/1
7.5 TABLET, ORALLY DISINTEGRATING ORAL AT BEDTIME
Refills: 0 | Status: DISCONTINUED | OUTPATIENT
Start: 2021-08-18 | End: 2021-08-19

## 2021-08-18 RX ORDER — SERTRALINE 25 MG/1
25 TABLET, FILM COATED ORAL
Refills: 0 | Status: COMPLETED | OUTPATIENT
Start: 2021-08-18 | End: 2021-08-18

## 2021-08-18 RX ADMIN — MIRTAZAPINE 7.5 MILLIGRAM(S): 45 TABLET, ORALLY DISINTEGRATING ORAL at 21:58

## 2021-08-18 RX ADMIN — SERTRALINE 25 MILLIGRAM(S): 25 TABLET, FILM COATED ORAL at 10:22

## 2021-08-18 RX ADMIN — PANTOPRAZOLE SODIUM 40 MILLIGRAM(S): 20 TABLET, DELAYED RELEASE ORAL at 07:08

## 2021-08-18 RX ADMIN — SERTRALINE 25 MILLIGRAM(S): 25 TABLET, FILM COATED ORAL at 13:25

## 2021-08-18 RX ADMIN — OLANZAPINE 10 MILLIGRAM(S): 15 TABLET, FILM COATED ORAL at 21:57

## 2021-08-18 NOTE — BH INPATIENT PSYCHIATRY PROGRESS NOTE - NSBHMSEINSIGHT_PSY_A_CORE
Discussed with Dr Sandy Jaquez in regards to LIZETT procedure, physician stated will not do today and for Dr More Funes to follow up with tomorrow. Poor

## 2021-08-19 VITALS
DIASTOLIC BLOOD PRESSURE: 65 MMHG | RESPIRATION RATE: 20 BRPM | OXYGEN SATURATION: 98 % | HEART RATE: 81 BPM | SYSTOLIC BLOOD PRESSURE: 101 MMHG | TEMPERATURE: 98 F

## 2021-08-19 PROCEDURE — 85025 COMPLETE CBC W/AUTO DIFF WBC: CPT

## 2021-08-19 PROCEDURE — 80048 BASIC METABOLIC PNL TOTAL CA: CPT

## 2021-08-19 PROCEDURE — 80307 DRUG TEST PRSMV CHEM ANLYZR: CPT

## 2021-08-19 PROCEDURE — 93005 ELECTROCARDIOGRAM TRACING: CPT

## 2021-08-19 PROCEDURE — U0003: CPT

## 2021-08-19 PROCEDURE — U0005: CPT

## 2021-08-19 PROCEDURE — 84443 ASSAY THYROID STIM HORMONE: CPT

## 2021-08-19 PROCEDURE — 81001 URINALYSIS AUTO W/SCOPE: CPT

## 2021-08-19 PROCEDURE — 86769 SARS-COV-2 COVID-19 ANTIBODY: CPT

## 2021-08-19 PROCEDURE — 36415 COLL VENOUS BLD VENIPUNCTURE: CPT

## 2021-08-19 PROCEDURE — 99285 EMERGENCY DEPT VISIT HI MDM: CPT

## 2021-08-19 PROCEDURE — 90834 PSYTX W PT 45 MINUTES: CPT

## 2021-08-19 PROCEDURE — 80061 LIPID PANEL: CPT

## 2021-08-19 PROCEDURE — 99238 HOSP IP/OBS DSCHRG MGMT 30/<: CPT

## 2021-08-19 RX ORDER — MIRTAZAPINE 45 MG/1
1 TABLET, ORALLY DISINTEGRATING ORAL
Qty: 30 | Refills: 0
Start: 2021-08-19 | End: 2021-09-17

## 2021-08-19 RX ORDER — SERTRALINE 25 MG/1
1 TABLET, FILM COATED ORAL
Qty: 30 | Refills: 0
Start: 2021-08-19 | End: 2021-09-17

## 2021-08-19 RX ORDER — OLANZAPINE 15 MG/1
1 TABLET, FILM COATED ORAL
Qty: 30 | Refills: 0
Start: 2021-08-19 | End: 2021-09-17

## 2021-08-19 RX ORDER — PANTOPRAZOLE SODIUM 20 MG/1
1 TABLET, DELAYED RELEASE ORAL
Qty: 30 | Refills: 0
Start: 2021-08-19 | End: 2021-09-17

## 2021-08-19 RX ADMIN — PANTOPRAZOLE SODIUM 40 MILLIGRAM(S): 20 TABLET, DELAYED RELEASE ORAL at 07:36

## 2021-08-19 RX ADMIN — SERTRALINE 50 MILLIGRAM(S): 25 TABLET, FILM COATED ORAL at 09:38

## 2021-08-19 NOTE — BH INPATIENT PSYCHIATRY DISCHARGE NOTE - NSBHMETABOLIC_PSY_ALL_CORE_FT
BMI: BMI (kg/m2): 28.3 (08-10-21 @ 08:04)  HbA1c:   Glucose:   BP: 119/82 (08-18-21 @ 15:43) (91/91 - 140/90)  Lipid Panel: Date/Time: 08-11-21 @ 08:36  Cholesterol, Serum: 146  Direct LDL: --  HDL Cholesterol, Serum: 57  Total Cholesterol/HDL Ration Measurement: --  Triglycerides, Serum: 55

## 2021-08-19 NOTE — BH PSYCHOLOGY - CLINICIAN PSYCHOTHERAPY NOTE - NSBHPSYCHOLGOALS_PSY_A_CORE
Assessment/Improve level of independent functioning/Prevent relapse/Treatment compliance

## 2021-08-19 NOTE — BH DISCHARGE NOTE NURSING/SOCIAL WORK/PSYCH REHAB - PATIENT PORTAL LINK FT
You can access the FollowMyHealth Patient Portal offered by [facility] by registering at the following website: [email or web address]. By joining [employer]’s FollowMyHealth portal, you will also be able to view your health information using other applications (apps) compatible with our system.

## 2021-08-19 NOTE — BH INPATIENT PSYCHIATRY PROGRESS NOTE - NSBHASSESSSUMMFT_PSY_ALL_CORE
--interval cc and focus of care: suicidal ideation and depressed affect and insomnia;  outcome to date: on Zyprexa 5mg continues to have early insomnia ; appears constricted; sad and blocked; denies SI.  --Background:  32 yo F w/ no known PMH presenting with cc of agitation, insomnia, SI. Patient was recently admitted to [facility] after an acute psychotic episode. Patient states shes recently immig[place] to the [place] for residency at another known hospital in [facility]. [place]oc[place] a [place] and starting residency was a challenge for her adjusting. however 1 month ago she became increasingly overwhelmed after dealing with the death of a critical patient. shortly after, she began to have vivid dreams reliving the event, insomnia, paranoia, hallucinations (visual and auditory), SI (without intent or plan)/no HI. Patients spouse reports episodes of Reyes walking the apartment complex where they live knocking on neighbors doors with paranoid hallucinations. Patient was voluntarily admitted at [facility] 1 month ago for acute psychotic episode and initially treated with Risperidone 3mg qd which she says helped. however patient began to develop side effects including lethargy, hyperprolactinemia, and nausea. during f/u at [facility] she decreased from risperidone 3mg qd to 2mg qd, then switched to aripiprazole 5mg qd followed by Ziprasidone 20mg qd. ; ;today patient is presenting to the hospital because she feels her symptoms are not under control. she felt risperidone helped but after switched to these new medications she is starting to feel as if shes going to have another psychotic event. shes endorsing, agitation, insomnia, restlessness (including pacing and signs of akinesia). shes endorsing vivid dreams. denies hallucination (visual, auditory, or tactile), denies racing thoughts. endorses SI (with thoughts of jumping out of a window) however states she would never act on it, loves herself, her spouse and loved ones. ;  --course:  ;;08/16: early insomnia using prn Ativan;  continues blocked; constricicted; guarded but no meniton of SI; will raise Zyprexa to 7.5mg po at night and consider adding Zyprexa;  psychosis appears sustained rather than triggered ; depression with psychosis vs. PTSD under consideration.   ;;08/17: signficiant insomnia as per patient will unstable mood up and down; accepting Zyprexa 10mg po at night for mood and Zoloft 25mg po daily for depressed mood; however denies SI at this time; good adls; 
--interval cc and focus of care: suicidal ideation and depressed affect and insomnia;  outcome to date: on Zyprexa 10mg mood and sleep improved  --Background:  32 yo F w/ no known PMH presenting with cc of agitation, insomnia, SI. Patient was recently admitted to [facility] after an acute psychotic episode. Patient states shes recently [place] the [place] for residency at another known hospital in [facility]. relocating to a foreign country and starting residency was a challenge for her adjusting. however 1 month ago she became increasingly overwhelmed after dealing with the death of a critical patient. shortly after, she began to have vivid dreams reliving the event, insomnia, paranoia, hallucinations (visual and auditory), SI (without intent or plan)/no HI. Patients spouse reports episodes of Re[place] walking the apartment complex where they live knocking on neighbors doors with paranoid hallucinations. Patient was voluntarily admitted at [facility] ([facility] 1 month ago for acute psychotic episode and initially treated with Risperidone 3mg qd which she says helped. however patient began to develop side effects including lethargy, hyperprolactinemia, and nausea. during f/u at [facility] she decreased from risperidone 3mg qd to 2mg qd, then switched to aripiprazole 5mg qd followed by Ziprasidone 20mg qd. ; ;today patient is presenting to the hospital because she feels her symptoms are not under control. she felt risperidone helped but after switched to these new medications she is starting to feel as if shes going to have another psychotic event. shes endorsing, agitation, insomnia, restlessness (including pacing and signs of akinesia). shes endorsing vivid dreams. denies hallucination (visual, auditory, or tactile), denies racing thoughts. endorses SI (with thoughts of jumping out of a window) however states she would never act on it, loves herself, her spouse and loved ones. ;  --course:  ;;08/16: early insomnia using prn Ativan;  continues blocked; constricted; guarded but no mention of SI; will raise Zyprexa to 7.5mg po at night and consider adding Zyprexa;  psychosis appears sustained rather than triggered ; depression with psychosis vs. PTSD under consideration.   ;;08/17: significant insomnia as per patient will unstable mood up and down; accepting Zyprexa  at 10mg po at night for mood and Zoloft 25mg po daily for depressed mood; however denies SI at this time; good adls;   ;;08/18: improved sleep and mood ; will try prn Remeron tonight as sleep aide if needed; not as blocked; not as constricted; may be partially related to menstrual cycle;  d/c in am if continues stable with no SI and better sleep. 
--interval cc and focus of care: suicidal ideation and depressed affect and insomnia;  outcome to date: on Zyprexa 5mg continues to have early insomnia ; appears constricted; sad and blocked; denies SI.  --Background:  30 yo F w/ no known PMH presenting with cc of agitation, insomnia, SI. Patient was recently admitted to [facility] after an acute psychotic episode. Patient states shes recently [place] to the [place] for residency at another known hospital in [facility]. relocating to a foreign country and starting residency was a challenge for her adjusting. however 1 month ago she became increasingly overwhelmed after dealing with the death of a critical patient. shortly after, she began to have vivid dreams reliving the event, insomnia, paranoia, hallucinations (visual and auditory), SI (without intent or plan)/no HI. Patients spouse reports episodes of Re[place] walking the apartment complex where they live knocking on neighbors doors with paranoid hallucinations. Patient was voluntarily admitted at [facility] 1 month ago for acute psychotic episode and initially treated with Risperidone 3mg qd which she says helped. however patient began to develop side effects including lethargy, hyperprolactinemia, and nausea. during f/u at [facility] she decreased from risperidone 3mg qd to 2mg qd, then switched to aripiprazole 5mg qd followed by Ziprasidone 20mg qd. ; ;today patient is presenting to the hospital because she feels her symptoms are not under control. she felt risperidone helped but after switched to these new medications she is starting to feel as if shes going to have another psychotic event. shes endorsing, agitation, insomnia, restlessness (including pacing and signs of akinesia). shes endorsing vivid dreams. denies hallucination (visual, auditory, or tactile), denies racing thoughts. endorses SI (with thoughts of jumping out of a window) however states she would never act on it, loves herself, her spouse and loved ones. ;  --course:  ;;08/16: early insomnia using prn Ativan;  continues blocked; constricicted; guarded but no meniton of SI; will raise Zyprexa to 7.5mg po at night and consider adding Zyprexa;  psychosis appears sustained rather than triggered ; depression with psychosis vs. PTSD under consideration.   ;;08/17: signficiant insomnia as per patient will unstable mood up and down; accepting Zyprexa  at 10mg po at night for mood and Zoloft 25mg po daily for depressed mood; however denies SI at this time; good adls;   ;;08/18: improved sleep and mood ; will try prn Remeron tonight as sleep aide if needed; not as blocked; not as constricted; may be partially related to mensutral cycle;  d/c in am if continues stable with no SI and better sleep.  ;;08/19: slept well with Remeron 7.5mg at night; looks forward to going to the park and seeing her wife again.  no s/h I/i/p or avh; slightly anxious but otherwise good adls ; good eye contact; appropriate for aftercare

## 2021-08-19 NOTE — BH INPATIENT PSYCHIATRY PROGRESS NOTE - NSICDXBHSECONDARYDX_PSY_ALL_CORE
Brief psychotic disorder   F23  Late luteal phase dysphoric disorder (LLPDD)   F32.81  
Brief psychotic disorder   F23  Late luteal phase dysphoric disorder (LLPDD)   F32.81

## 2021-08-19 NOTE — BH SAFETY PLAN - WARNING SIGN 1
Safety plan was completed on the paper document with [facility], suicide specific safety plan. Please see the chart.

## 2021-08-19 NOTE — BH INPATIENT PSYCHIATRY DISCHARGE NOTE - HPI (INCLUDE ILLNESS QUALITY, SEVERITY, DURATION, TIMING, CONTEXT, MODIFYING FACTORS, ASSOCIATED SIGNS AND SYMPTOMS)
Patient is a 32y/o , employed, domiciled  female with no pertinent medical history. Psychiatric history significant for 1 prior hospitalization recently at [facility] July 2021 following a brief psychotic episode, since then she has been in treatment with a psychiatrist at [facility] for med management. Not currently receiving therapy, though she would like to. No hx of suicide attempts, substance use or legal issues. Patient presents to ED bibEMS activated by herself for worsening agitation, akathisia and accompanying passive suicidal thoughts following medication adjustments. Patient is transferred to [facility] voluntary status for medication management.    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 She requested to leave a couple hours early one day to open a bank account and this was denied. After the loss of the patient she then had a panic attack with the PG2 and requested time off. (Pt notably tearful as she recounts this interaction) She states that her wife was called to pick her up at work one day because she was notably disorganized, she verbalized feeling that things around her were poisonous and felt that the PG2 was a 'terrorist.' Pt also began experiencing thought broadcasting feeling that the tv was talking to her as well as enacting her life in various episodes. She felt that her life declan was bought by [employer] and was being broadcasted by [employer]. She was taken to [facility] and admitted for a Brief Psychotic Episode, treated with Risperdal 3mg qhs and discharged after 10 days with aftercare at [facility]. She reported that Risperdal was effective and she felt much better at time of discharge however, she began experiencing nausea, blurry vision. Lab work indicated prolactinemia and her outpt psychiatrist decreased the Risperdal to 2mg. Pt however continued to have side effect of nausea and Risperdal was discontinued and Abilify 5mg implemented x 7 days. Pt then began to experience restlessness, vivid/distressing dreams and incontinence (thinks it may be due to anxiety) with Abilify. Abilify was then discontinued and the day prior to coming to hospital Geodon 20mg was initiated. Pt took one dose, but her side effects continued, making her increasingly uncomfortable to the point where she began having SI (no intent) to jump out of her apartment window to stop how badly she was feeling. She alerted ems and asked to be taken to a [facility].    Patient also reports worsening sleep after her discharge, decrease in appetite as well as ongoing akathisia. She did receive Ativan in ED after continually pacing throughout the day- which she found to be helpful. She denies any a/v h or paranoid ideations after her last hospitalizations. Denies current si/hi and is future oriented. She reports a hx of anxiety attacks >10 years ago prior to telling family that she was moore.    Denies any hx of maikel, elevated mood, goal directed behavior etc. Denies any hx of depressive symptoms.

## 2021-08-19 NOTE — BH INPATIENT PSYCHIATRY PROGRESS NOTE - NSTXDEPRESGOAL_PSY_ALL_CORE
Report using a coping skill to overcome sadness and worry in order to socialize with peers daily

## 2021-08-19 NOTE — BH PSYCHOLOGY - CLINICIAN PSYCHOTHERAPY NOTE - NSTXANXINTERPSY_PSY_ALL_CORE
CBT for anxiety techniques and coping skills. 

## 2021-08-19 NOTE — BH INPATIENT PSYCHIATRY PROGRESS NOTE - NSBHMETABOLIC_PSY_ALL_CORE_FT
BMI: BMI (kg/m2): 28.3 (08-10-21 @ 08:04)  HbA1c:   Glucose:   BP: 111/74 (08-15-21 @ 16:21) (102/67 - 112/75)  Lipid Panel: Date/Time: 08-11-21 @ 08:36  Cholesterol, Serum: 146  Direct LDL: --  HDL Cholesterol, Serum: 57  Total Cholesterol/HDL Ration Measurement: --  Triglycerides, Serum: 55  
BMI: BMI (kg/m2): 28.3 (08-10-21 @ 08:04)  HbA1c:   Glucose:   BP: 140/90 (08-17-21 @ 16:05) (91/91 - 140/90)  Lipid Panel: Date/Time: 08-11-21 @ 08:36  Cholesterol, Serum: 146  Direct LDL: --  HDL Cholesterol, Serum: 57  Total Cholesterol/HDL Ration Measurement: --  Triglycerides, Serum: 55  
BMI: BMI (kg/m2): 28.3 (08-10-21 @ 08:04)  HbA1c:   Glucose:   BP: 119/80 (08-16-21 @ 15:48) (97/64 - 119/80)  Lipid Panel: Date/Time: 08-11-21 @ 08:36  Cholesterol, Serum: 146  Direct LDL: --  HDL Cholesterol, Serum: 57  Total Cholesterol/HDL Ration Measurement: --  Triglycerides, Serum: 55  
BMI: BMI (kg/m2): 28.3 (08-10-21 @ 08:04)  HbA1c:   Glucose:   BP: 119/82 (08-18-21 @ 15:43) (91/91 - 140/90)  Lipid Panel: Date/Time: 08-11-21 @ 08:36  Cholesterol, Serum: 146  Direct LDL: --  HDL Cholesterol, Serum: 57  Total Cholesterol/HDL Ration Measurement: --  Triglycerides, Serum: 55

## 2021-08-19 NOTE — BH PSYCHOLOGY - CLINICIAN PSYCHOTHERAPY NOTE - NSBHPSYCHOLRESPCOMMENT_PSY_A_CORE FT
Pt discussed all the stressors leading up to her psychosis. Accepted psychological support. 
Pt discussed all the stressors leading up to her psychosis. Accepted psychological support. 
Pt discussed all the stressors leading up to her psychosis. Accepted psychological support, and also discussed that some of her difficulties may be medication related. 
Accepted psychological support.

## 2021-08-19 NOTE — BH INPATIENT PSYCHIATRY DISCHARGE NOTE - HOSPITAL COURSE
--interval cc and focus of care: suicidal ideation and depressed affect and insomnia;  outcome to date: on Zyprexa 5mg continues to have early insomnia ; appears constricted; sad and blocked; denies SI.  --Background:  32 yo F w/ no known PMH presenting with cc of agitation, insomnia, SI. Patient was recently admitted to [facility] after an acute psychotic episode. Patient states shes recently [place] to the [place] for residency at another known hospital in [facility]. relocating to a foreign country and starting residency was a challenge for her adjusting. however 1 month ago she became increasingly overwhelmed after dealing with the death of a critical patient. shortly after, she began to have vivid dreams reliving the event, insomnia, paranoia, hallucinations (visual and auditory), SI (without intent or plan)/no HI. Patients spouse reports episodes of Reyes walking the apartment complex where they live knocking on neighbors doors with paranoid hallucinations. Patient was voluntarily admitted at [facility] 1 month ago for acute psychotic episode and initially treated with Risperidone 3mg qd which she says helped. however patient began to develop side effects including lethargy, hyperprolactinemia, and nausea. during f/u at [facility] she decreased from risperidone 3mg qd to 2mg qd, then switched to aripiprazole 5mg qd followed by Ziprasidone 20mg qd. ; ;today patient is presenting to the hospital because she feels her symptoms are not under control. she felt risperidone helped but after switched to these new medications she is starting to feel as if shes going to have another psychotic event. shes endorsing, agitation, insomnia, restlessness (including pacing and signs of akinesia). shes endorsing vivid dreams. denies hallucination (visual, auditory, or tactile), denies racing thoughts. endorses SI (with thoughts of jumping out of a window) however states she would never act on it, loves herself, her spouse and loved ones. ;  --course:  ;;08/16: early insomnia using prn Ativan;  continues blocked; constricicted; guarded but no meniton of SI; will raise Zyprexa to 7.5mg po at night and consider adding Zyprexa;  psychosis appears sustained rather than triggered ; depression with psychosis vs. PTSD under consideration.   ;;08/17: signficiant insomnia as per patient will unstable mood up and down; accepting Zyprexa  at 10mg po at night for mood and Zoloft 25mg po daily for depressed mood; however denies SI at this time; good adls;   ;;08/18: improved sleep and mood ; will try prn Remeron tonight as sleep aide if needed; not as blocked; not as constricted; may be partially related to mensutral cycle;  d/c in am if continues stable with no SI and better sleep.   ;;08/19: slept well with Remeron 7.5mg at night; looks forward to going to the park and seeing her wife again.  no s/h I/i/p or avh; slightly anxious but otherwise good adls ; good eye contact; appropriate for aftercare

## 2021-08-19 NOTE — BH PSYCHOLOGY - CLINICIAN PSYCHOTHERAPY NOTE - NSBHPSYCHOLADDL_PSY_A_CORE
Patient is a 30y/o , employed, domiciled  female with no pertinent medical history. Psychiatric history significant for 1 prior hospitalization recently at [facility] July 2021 following a brief psychotic episode, since then she has been in treatment with a psychiatrist at [facility] for med management. Not currently receiving therapy, though she would like to. No hx of suicide attempts, substance use or legal issues. Patient presents to ED bibEMS activated by herself for worsening agitation, akathisia and accompanying passive suicidal thoughts following medication adjustments. Patient is transferred to [facility] voluntary status for medication management. During todays session, pt's sxs have been addressed and she is ready for discharge

## 2021-08-19 NOTE — BH INPATIENT PSYCHIATRY DISCHARGE NOTE - NSDCCPCAREPLAN_GEN_ALL_CORE_FT
PRINCIPAL DISCHARGE DIAGNOSIS  Diagnosis: Severe recurrent major depression with psychotic features  Assessment and Plan of Treatment:        PRINCIPAL DISCHARGE DIAGNOSIS  Diagnosis: Severe recurrent major depression with psychotic features  Assessment and Plan of Treatment:       SECONDARY DISCHARGE DIAGNOSES  Diagnosis: GERD (gastroesophageal reflux disease)  Assessment and Plan of Treatment:

## 2021-08-19 NOTE — BH PSYCHOLOGY - CLINICIAN PSYCHOTHERAPY NOTE - NSBHPSYCHOLRESPONSE_PSY_A_CORE
Coping skills acquired/Insight displayed/Accepted support

## 2021-08-19 NOTE — BH PSYCHOLOGY - CLINICIAN PSYCHOTHERAPY NOTE - NSBHPSYCHOLPARTICIPCOMMENT_PSY_A_CORE FT
PT participated, was forthcoming about her difficulties and participated in the session .
PT participated, was forthcoming about her difficulties and participated in Progressive Muscle Relaxation.
PT participated in the session and safety planning, showed motivation 
PT participated, was forthcoming about her difficulties and participated in the session .

## 2021-08-19 NOTE — BH PSYCHOLOGY - CLINICIAN PSYCHOTHERAPY NOTE - NSBHPSYCHOLDISCUSS_PSY_A_CORE
Discussion with collaborating staff took place since patient's last visit

## 2021-08-19 NOTE — BH INPATIENT PSYCHIATRY PROGRESS NOTE - NSBHFUPINTERVALHXFT_PSY_A_CORE
Had difficulty falling asleep; internally preoccupied; no mention of s/h i/i/p or avh but blocked with constricted almost blunted affect; fair to poor eye contact; guardedness and slightl suspiciousness;  fair adls;  
Continues to endorse insomnia and depressed mood but not SI today;  accpeting Zyprexa titration up to 10mg and introduction of Zoloft 25mg am for depression;  good adls; more spontaneous; good participation in groups. 
Slept well last night; credits Ativan; willing to try Remeron tonight;  states she is at the end of her menstural cycle for this month and notes that her sister has significant mood changes with menustruation ; perhaps she might also share this; discussed Zoloft raised to 50mg daily for mood; Zyprexa at 10mg  for paranoid trends and thought blocking; patient is much less constricted; more communicative; better related;  
slept well with Remeron; looks forward to the park and seeing her wife again;  good adls; no s/h I/i/p or avh but a little sad and sightly constricted; cog intact; good eye ocntact; no peculiarities.

## 2021-08-19 NOTE — BH INPATIENT PSYCHIATRY PROGRESS NOTE - NSBHINPTBILLING_PSY_ALL_CORE
16634 - Initial hospital care - moderate complexity
Time based billing
[id number] - Inpatient Moderate Complexity
[id number] - Hospital Discharge Day Management; more than 30 min

## 2021-08-19 NOTE — BH INPATIENT PSYCHIATRY PROGRESS NOTE - CURRENT MEDICATION
MEDICATIONS  (STANDING):  OLANZapine 7.5 milliGRAM(s) Oral at bedtime  pantoprazole    Tablet 40 milliGRAM(s) Oral before breakfast    MEDICATIONS  (PRN):  acetaminophen   Tablet .. 650 milliGRAM(s) Oral every 6 hours PRN Mild Pain (1-3)  aluminum hydroxide/magnesium hydroxide/simethicone Suspension 30 milliLiter(s) Oral every 4 hours PRN Dyspepsia  haloperidol     Tablet 2 milliGRAM(s) Oral every 6 hours PRN agitation  ibuprofen  Tablet. 400 milliGRAM(s) Oral every 6 hours PRN Moderate Pain (4 - 6), Severe Pain (7 - 10)  LORazepam     Tablet 2 milliGRAM(s) Oral every 6 hours PRN severe anxiety  traZODone 50 milliGRAM(s) Oral at bedtime PRN insomnia  
MEDICATIONS  (STANDING):  OLANZapine 5 milliGRAM(s) Oral at bedtime  pantoprazole    Tablet 40 milliGRAM(s) Oral before breakfast    MEDICATIONS  (PRN):  acetaminophen   Tablet .. 650 milliGRAM(s) Oral every 6 hours PRN Mild Pain (1-3)  aluminum hydroxide/magnesium hydroxide/simethicone Suspension 30 milliLiter(s) Oral every 4 hours PRN Dyspepsia  haloperidol     Tablet 2 milliGRAM(s) Oral every 6 hours PRN agitation  ibuprofen  Tablet. 400 milliGRAM(s) Oral every 6 hours PRN Moderate Pain (4 - 6), Severe Pain (7 - 10)  LORazepam     Tablet 2 milliGRAM(s) Oral every 6 hours PRN Agitation  traZODone 50 milliGRAM(s) Oral at bedtime PRN insomnia  
MEDICATIONS  (STANDING):  OLANZapine 10 milliGRAM(s) Oral at bedtime  pantoprazole    Tablet 40 milliGRAM(s) Oral before breakfast  sertraline 25 milliGRAM(s) Oral daily    MEDICATIONS  (PRN):  acetaminophen   Tablet .. 650 milliGRAM(s) Oral every 6 hours PRN Mild Pain (1-3)  aluminum hydroxide/magnesium hydroxide/simethicone Suspension 30 milliLiter(s) Oral every 4 hours PRN Dyspepsia  haloperidol     Tablet 2 milliGRAM(s) Oral every 6 hours PRN agitation  ibuprofen  Tablet. 400 milliGRAM(s) Oral every 6 hours PRN Moderate Pain (4 - 6), Severe Pain (7 - 10)  LORazepam     Tablet 2 milliGRAM(s) Oral every 6 hours PRN severe anxiety  traZODone 50 milliGRAM(s) Oral at bedtime PRN insomnia  
MEDICATIONS  (STANDING):  OLANZapine 10 milliGRAM(s) Oral at bedtime  pantoprazole    Tablet 40 milliGRAM(s) Oral before breakfast  sertraline 50 milliGRAM(s) Oral daily    MEDICATIONS  (PRN):  acetaminophen   Tablet .. 650 milliGRAM(s) Oral every 6 hours PRN Mild Pain (1-3)  aluminum hydroxide/magnesium hydroxide/simethicone Suspension 30 milliLiter(s) Oral every 4 hours PRN Dyspepsia  haloperidol     Tablet 2 milliGRAM(s) Oral every 6 hours PRN agitation  ibuprofen  Tablet. 400 milliGRAM(s) Oral every 6 hours PRN Moderate Pain (4 - 6), Severe Pain (7 - 10)  LORazepam     Tablet 2 milliGRAM(s) Oral every 6 hours PRN severe anxiety  mirtazapine 7.5 milliGRAM(s) Oral at bedtime PRN insomnia  traZODone 50 milliGRAM(s) Oral at bedtime PRN insomnia

## 2021-08-19 NOTE — BH INPATIENT PSYCHIATRY PROGRESS NOTE - NSCGISEVERILLNESS_PSY_ALL_CORE
5 = Markedly ill - intrusive symptoms that distinctly impair social/occupational function or cause intrusive levels of distress
3 = Mildly ill – clearly established symptoms with minimal, if any, distress or difficulty in social and occupational function

## 2021-08-19 NOTE — BH DISCHARGE NOTE NURSING/SOCIAL WORK/PSYCH REHAB - NSCDUDCCRISIS_PSY_A_CORE
[facility]  1 (844) [facility] ([phone])  Text "WELL" to 07430  Website: www.[employer]/.[facility] 1 (757) 871-BKPD (9586) Website: www.safehorizon.org/.National Suicide Prevention Lifeline 9 (070) 438-9108/.  [facility]  1 (748) [employer] (737-4240)/.  [facility]  [address] [place] [address] [phone]   Hours:  Monday through Friday from 9 AM to 3 PM/.  [employer] - [facility] Crisis Line  [phone], Option 1

## 2021-08-19 NOTE — BH PSYCHOLOGY - CLINICIAN PSYCHOTHERAPY NOTE - NSTXDEPRESGOAL_PSY_ALL_CORE
Report using a coping skill to overcome sadness and worry in order to socialize with peers daily

## 2021-08-19 NOTE — BH INPATIENT PSYCHIATRY PROGRESS NOTE - PRN MEDS
MEDICATIONS  (PRN):  acetaminophen   Tablet .. 650 milliGRAM(s) Oral every 6 hours PRN Mild Pain (1-3)  aluminum hydroxide/magnesium hydroxide/simethicone Suspension 30 milliLiter(s) Oral every 4 hours PRN Dyspepsia  haloperidol     Tablet 2 milliGRAM(s) Oral every 6 hours PRN agitation  ibuprofen  Tablet. 400 milliGRAM(s) Oral every 6 hours PRN Moderate Pain (4 - 6), Severe Pain (7 - 10)  LORazepam     Tablet 2 milliGRAM(s) Oral every 6 hours PRN Agitation  traZODone 50 milliGRAM(s) Oral at bedtime PRN insomnia  
MEDICATIONS  (PRN):  acetaminophen   Tablet .. 650 milliGRAM(s) Oral every 6 hours PRN Mild Pain (1-3)  aluminum hydroxide/magnesium hydroxide/simethicone Suspension 30 milliLiter(s) Oral every 4 hours PRN Dyspepsia  haloperidol     Tablet 2 milliGRAM(s) Oral every 6 hours PRN agitation  ibuprofen  Tablet. 400 milliGRAM(s) Oral every 6 hours PRN Moderate Pain (4 - 6), Severe Pain (7 - 10)  LORazepam     Tablet 2 milliGRAM(s) Oral every 6 hours PRN severe anxiety  mirtazapine 7.5 milliGRAM(s) Oral at bedtime PRN insomnia  traZODone 50 milliGRAM(s) Oral at bedtime PRN insomnia  
MEDICATIONS  (PRN):  acetaminophen   Tablet .. 650 milliGRAM(s) Oral every 6 hours PRN Mild Pain (1-3)  aluminum hydroxide/magnesium hydroxide/simethicone Suspension 30 milliLiter(s) Oral every 4 hours PRN Dyspepsia  haloperidol     Tablet 2 milliGRAM(s) Oral every 6 hours PRN agitation  ibuprofen  Tablet. 400 milliGRAM(s) Oral every 6 hours PRN Moderate Pain (4 - 6), Severe Pain (7 - 10)  LORazepam     Tablet 2 milliGRAM(s) Oral every 6 hours PRN severe anxiety  traZODone 50 milliGRAM(s) Oral at bedtime PRN insomnia  
MEDICATIONS  (PRN):  acetaminophen   Tablet .. 650 milliGRAM(s) Oral every 6 hours PRN Mild Pain (1-3)  aluminum hydroxide/magnesium hydroxide/simethicone Suspension 30 milliLiter(s) Oral every 4 hours PRN Dyspepsia  haloperidol     Tablet 2 milliGRAM(s) Oral every 6 hours PRN agitation  ibuprofen  Tablet. 400 milliGRAM(s) Oral every 6 hours PRN Moderate Pain (4 - 6), Severe Pain (7 - 10)  LORazepam     Tablet 2 milliGRAM(s) Oral every 6 hours PRN severe anxiety  traZODone 50 milliGRAM(s) Oral at bedtime PRN insomnia

## 2021-08-19 NOTE — BH INPATIENT PSYCHIATRY PROGRESS NOTE - NSBHFUPINTERVALCCFT_PSY_A_CORE
treated for depressed mood and disorganization 
treated for depression and suicidal thinking 

## 2021-08-19 NOTE — BH INPATIENT PSYCHIATRY PROGRESS NOTE - NSTXANXGOAL_PSY_ALL_CORE
Be able to participate in activities despite lingering anxiety/panic

## 2021-08-19 NOTE — BH INPATIENT PSYCHIATRY PROGRESS NOTE - NSDCCRITERIA_PSY_ALL_CORE
Mood improved; sleep improved; not blocked;

## 2021-08-19 NOTE — BH INPATIENT PSYCHIATRY PROGRESS NOTE - NSICDXBHPRIMARYDX_PSY_ALL_CORE
Depression, major, recurrent, severe with psychosis   F33.3  

## 2021-08-19 NOTE — BH INPATIENT PSYCHIATRY PROGRESS NOTE - NSBHCHARTREVIEWVS_PSY_A_CORE FT
Vital Signs Last 24 Hrs  T(C): 36.8 (18 Aug 2021 15:43), Max: 36.8 (18 Aug 2021 09:40)  T(F): 98.3 (18 Aug 2021 15:43), Max: 98.3 (18 Aug 2021 15:43)  HR: 81 (18 Aug 2021 15:43) (81 - 83)  BP: 119/82 (18 Aug 2021 15:43) (119/82 - 134/80)  BP(mean): --  RR: 18 (18 Aug 2021 15:43) (18 - 20)  SpO2: 99% (18 Aug 2021 15:43) (99% - 99%)
Vital Signs Last 24 Hrs  T(C): 36.9 (15 Aug 2021 16:21), Max: 36.9 (15 Aug 2021 16:21)  T(F): 98.5 (15 Aug 2021 16:21), Max: 98.5 (15 Aug 2021 16:21)  HR: 80 (15 Aug 2021 16:21) (76 - 80)  BP: 111/74 (15 Aug 2021 16:21) (102/67 - 111/74)  BP(mean): --  RR: 18 (15 Aug 2021 16:21) (18 - 18)  SpO2: 99% (15 Aug 2021 16:21) (99% - 100%)
Vital Signs Last 24 Hrs  T(C): 36.9 (16 Aug 2021 15:48), Max: 36.9 (16 Aug 2021 08:36)  T(F): 98.4 (16 Aug 2021 15:48), Max: 98.4 (16 Aug 2021 08:36)  HR: 90 (16 Aug 2021 15:48) (89 - 90)  BP: 119/80 (16 Aug 2021 15:48) (97/64 - 119/80)  BP(mean): --  RR: 18 (16 Aug 2021 15:48) (18 - 18)  SpO2: 100% (16 Aug 2021 15:48) (100% - 100%)
Vital Signs Last 24 Hrs  T(C): 36.7 (17 Aug 2021 16:05), Max: 36.7 (17 Aug 2021 15:53)  T(F): 98.1 (17 Aug 2021 16:05), Max: 98.1 (17 Aug 2021 15:53)  HR: 91 (17 Aug 2021 16:05) (78 - 91)  BP: 140/90 (17 Aug 2021 16:05) (91/91 - 140/90)  BP(mean): --  RR: 18 (17 Aug 2021 16:05) (18 - 20)  SpO2: 100% (17 Aug 2021 16:05) (99% - 100%)

## 2021-08-24 DIAGNOSIS — F23 BRIEF PSYCHOTIC DISORDER: ICD-10-CM

## 2021-08-24 DIAGNOSIS — K21.9 GASTRO-ESOPHAGEAL REFLUX DISEASE WITHOUT ESOPHAGITIS: ICD-10-CM

## 2021-08-24 DIAGNOSIS — F33.3 MAJOR DEPRESSIVE DISORDER, RECURRENT, SEVERE WITH PSYCHOTIC SYMPTOMS: ICD-10-CM

## 2021-08-24 DIAGNOSIS — R45.851 SUICIDAL IDEATIONS: ICD-10-CM

## 2021-09-07 ENCOUNTER — INPATIENT (INPATIENT)
Facility: HOSPITAL | Age: 31
LOS: 7 days | Discharge: HOME | DRG: 885 | End: 2021-09-15
Attending: PSYCHIATRY & NEUROLOGY | Admitting: PSYCHIATRY & NEUROLOGY
Payer: COMMERCIAL

## 2021-09-07 VITALS
SYSTOLIC BLOOD PRESSURE: 122 MMHG | DIASTOLIC BLOOD PRESSURE: 87 MMHG | TEMPERATURE: 98 F | HEART RATE: 102 BPM | RESPIRATION RATE: 17 BRPM | HEIGHT: 65 IN | OXYGEN SATURATION: 100 %

## 2021-09-07 DIAGNOSIS — F33.41 MAJOR DEPRESSIVE DISORDER, RECURRENT, IN PARTIAL REMISSION: ICD-10-CM

## 2021-09-07 DIAGNOSIS — F43.10 POST-TRAUMATIC STRESS DISORDER, UNSPECIFIED: ICD-10-CM

## 2021-09-07 PROBLEM — K29.70 GASTRITIS, UNSPECIFIED, WITHOUT BLEEDING: Chronic | Status: ACTIVE | Noted: 2021-08-17

## 2021-09-07 LAB
A1C WITH ESTIMATED AVERAGE GLUCOSE RESULT: 4.9 % — SIGNIFICANT CHANGE UP (ref 4–5.6)
ALBUMIN SERPL ELPH-MCNC: 4.6 G/DL — SIGNIFICANT CHANGE UP (ref 3.3–5)
ALP SERPL-CCNC: 54 U/L — SIGNIFICANT CHANGE UP (ref 40–120)
ALT FLD-CCNC: 18 U/L — SIGNIFICANT CHANGE UP (ref 10–45)
AMPHET UR-MCNC: NEGATIVE — SIGNIFICANT CHANGE UP
ANION GAP SERPL CALC-SCNC: 10 MMOL/L — SIGNIFICANT CHANGE UP (ref 5–17)
APAP SERPL-MCNC: <5 UG/ML — LOW (ref 10–30)
APPEARANCE UR: CLEAR — SIGNIFICANT CHANGE UP
AST SERPL-CCNC: 19 U/L — SIGNIFICANT CHANGE UP (ref 10–40)
BACTERIA # UR AUTO: PRESENT /HPF
BARBITURATES UR SCN-MCNC: NEGATIVE — SIGNIFICANT CHANGE UP
BASOPHILS # BLD AUTO: 0.04 K/UL — SIGNIFICANT CHANGE UP (ref 0–0.2)
BASOPHILS NFR BLD AUTO: 0.7 % — SIGNIFICANT CHANGE UP (ref 0–2)
BENZODIAZ UR-MCNC: NEGATIVE — SIGNIFICANT CHANGE UP
BILIRUB SERPL-MCNC: 0.6 MG/DL — SIGNIFICANT CHANGE UP (ref 0.2–1.2)
BILIRUB UR-MCNC: NEGATIVE — SIGNIFICANT CHANGE UP
BUN SERPL-MCNC: 9 MG/DL — SIGNIFICANT CHANGE UP (ref 7–23)
CALCIUM SERPL-MCNC: 9.6 MG/DL — SIGNIFICANT CHANGE UP (ref 8.4–10.5)
CHLORIDE SERPL-SCNC: 102 MMOL/L — SIGNIFICANT CHANGE UP (ref 96–108)
CO2 SERPL-SCNC: 27 MMOL/L — SIGNIFICANT CHANGE UP (ref 22–31)
COCAINE METAB.OTHER UR-MCNC: NEGATIVE — SIGNIFICANT CHANGE UP
COLOR SPEC: YELLOW — SIGNIFICANT CHANGE UP
CREAT SERPL-MCNC: 0.94 MG/DL — SIGNIFICANT CHANGE UP (ref 0.5–1.3)
DIFF PNL FLD: NEGATIVE — SIGNIFICANT CHANGE UP
EOSINOPHIL # BLD AUTO: 0.06 K/UL — SIGNIFICANT CHANGE UP (ref 0–0.5)
EOSINOPHIL NFR BLD AUTO: 1.1 % — SIGNIFICANT CHANGE UP (ref 0–6)
EPI CELLS # UR: ABNORMAL /HPF (ref 0–5)
ESTIMATED AVERAGE GLUCOSE: 94 MG/DL — SIGNIFICANT CHANGE UP (ref 68–114)
ETHANOL SERPL-MCNC: <10 MG/DL — SIGNIFICANT CHANGE UP (ref 0–10)
GLUCOSE SERPL-MCNC: 106 MG/DL — HIGH (ref 70–99)
GLUCOSE UR QL: NEGATIVE — SIGNIFICANT CHANGE UP
HCG SERPL-ACNC: <0 MIU/ML — SIGNIFICANT CHANGE UP
HCG UR QL: NEGATIVE — SIGNIFICANT CHANGE UP
HCT VFR BLD CALC: 42 % — SIGNIFICANT CHANGE UP (ref 34.5–45)
HGB BLD-MCNC: 14.5 G/DL — SIGNIFICANT CHANGE UP (ref 11.5–15.5)
IMM GRANULOCYTES NFR BLD AUTO: 0 % — SIGNIFICANT CHANGE UP (ref 0–1.5)
KETONES UR-MCNC: NEGATIVE — SIGNIFICANT CHANGE UP
LEUKOCYTE ESTERASE UR-ACNC: ABNORMAL
LYMPHOCYTES # BLD AUTO: 2.24 K/UL — SIGNIFICANT CHANGE UP (ref 1–3.3)
LYMPHOCYTES # BLD AUTO: 39.9 % — SIGNIFICANT CHANGE UP (ref 13–44)
MCHC RBC-ENTMCNC: 31 PG — SIGNIFICANT CHANGE UP (ref 27–34)
MCHC RBC-ENTMCNC: 34.5 GM/DL — SIGNIFICANT CHANGE UP (ref 32–36)
MCV RBC AUTO: 89.9 FL — SIGNIFICANT CHANGE UP (ref 80–100)
METHADONE UR-MCNC: NEGATIVE — SIGNIFICANT CHANGE UP
MONOCYTES # BLD AUTO: 0.67 K/UL — SIGNIFICANT CHANGE UP (ref 0–0.9)
MONOCYTES NFR BLD AUTO: 11.9 % — SIGNIFICANT CHANGE UP (ref 2–14)
NEUTROPHILS # BLD AUTO: 2.6 K/UL — SIGNIFICANT CHANGE UP (ref 1.8–7.4)
NEUTROPHILS NFR BLD AUTO: 46.4 % — SIGNIFICANT CHANGE UP (ref 43–77)
NITRITE UR-MCNC: NEGATIVE — SIGNIFICANT CHANGE UP
NRBC # BLD: 0 /100 WBCS — SIGNIFICANT CHANGE UP (ref 0–0)
OPIATES UR-MCNC: NEGATIVE — SIGNIFICANT CHANGE UP
PCP SPEC-MCNC: SIGNIFICANT CHANGE UP
PCP UR-MCNC: NEGATIVE — SIGNIFICANT CHANGE UP
PH UR: 6 — SIGNIFICANT CHANGE UP (ref 5–8)
PLATELET # BLD AUTO: 302 K/UL — SIGNIFICANT CHANGE UP (ref 150–400)
POTASSIUM SERPL-MCNC: 3.5 MMOL/L — SIGNIFICANT CHANGE UP (ref 3.5–5.3)
POTASSIUM SERPL-SCNC: 3.5 MMOL/L — SIGNIFICANT CHANGE UP (ref 3.5–5.3)
PROT SERPL-MCNC: 7.7 G/DL — SIGNIFICANT CHANGE UP (ref 6–8.3)
PROT UR-MCNC: NEGATIVE MG/DL — SIGNIFICANT CHANGE UP
RBC # BLD: 4.67 M/UL — SIGNIFICANT CHANGE UP (ref 3.8–5.2)
RBC # FLD: 12 % — SIGNIFICANT CHANGE UP (ref 10.3–14.5)
RBC CASTS # UR COMP ASSIST: < 5 /HPF — SIGNIFICANT CHANGE UP
SALICYLATES SERPL-MCNC: <0.3 MG/DL — LOW (ref 2.8–20)
SARS-COV-2 RNA SPEC QL NAA+PROBE: SIGNIFICANT CHANGE UP
SODIUM SERPL-SCNC: 139 MMOL/L — SIGNIFICANT CHANGE UP (ref 135–145)
SP GR SPEC: 1.01 — SIGNIFICANT CHANGE UP (ref 1–1.03)
THC UR QL: NEGATIVE — SIGNIFICANT CHANGE UP
TSH SERPL-MCNC: 1.95 UIU/ML — SIGNIFICANT CHANGE UP (ref 0.27–4.2)
UROBILINOGEN FLD QL: 0.2 E.U./DL — SIGNIFICANT CHANGE UP
WBC # BLD: 5.61 K/UL — SIGNIFICANT CHANGE UP (ref 3.8–10.5)
WBC # FLD AUTO: 5.61 K/UL — SIGNIFICANT CHANGE UP (ref 3.8–10.5)
WBC UR QL: ABNORMAL /HPF

## 2021-09-07 PROCEDURE — 99285 EMERGENCY DEPT VISIT HI MDM: CPT | Mod: CS

## 2021-09-07 PROCEDURE — 90792 PSYCH DIAG EVAL W/MED SRVCS: CPT | Mod: 95

## 2021-09-07 PROCEDURE — 99223 1ST HOSP IP/OBS HIGH 75: CPT

## 2021-09-07 RX ORDER — OLANZAPINE 15 MG/1
10 TABLET, FILM COATED ORAL AT BEDTIME
Refills: 0 | Status: DISCONTINUED | OUTPATIENT
Start: 2021-09-07 | End: 2021-09-15

## 2021-09-07 RX ORDER — MIRTAZAPINE 45 MG/1
7.5 TABLET, ORALLY DISINTEGRATING ORAL AT BEDTIME
Refills: 0 | Status: DISCONTINUED | OUTPATIENT
Start: 2021-09-07 | End: 2021-09-14

## 2021-09-07 RX ORDER — SERTRALINE 25 MG/1
75 TABLET, FILM COATED ORAL DAILY
Refills: 0 | Status: DISCONTINUED | OUTPATIENT
Start: 2021-09-07 | End: 2021-09-07

## 2021-09-07 RX ORDER — SERTRALINE 25 MG/1
100 TABLET, FILM COATED ORAL DAILY
Refills: 0 | Status: DISCONTINUED | OUTPATIENT
Start: 2021-09-08 | End: 2021-09-15

## 2021-09-07 RX ORDER — ACETAMINOPHEN 500 MG
650 TABLET ORAL EVERY 6 HOURS
Refills: 0 | Status: DISCONTINUED | OUTPATIENT
Start: 2021-09-07 | End: 2021-09-15

## 2021-09-07 RX ORDER — HYDROXYZINE HCL 10 MG
50 TABLET ORAL
Refills: 0 | Status: DISCONTINUED | OUTPATIENT
Start: 2021-09-07 | End: 2021-09-15

## 2021-09-07 RX ADMIN — Medication 50 MILLIGRAM(S): at 21:23

## 2021-09-07 RX ADMIN — MIRTAZAPINE 7.5 MILLIGRAM(S): 45 TABLET, ORALLY DISINTEGRATING ORAL at 21:23

## 2021-09-07 RX ADMIN — Medication 50 MILLIGRAM(S): at 12:51

## 2021-09-07 RX ADMIN — SERTRALINE 75 MILLIGRAM(S): 25 TABLET, FILM COATED ORAL at 09:39

## 2021-09-07 RX ADMIN — Medication 2 MILLIGRAM(S): at 16:41

## 2021-09-07 RX ADMIN — OLANZAPINE 10 MILLIGRAM(S): 15 TABLET, FILM COATED ORAL at 21:23

## 2021-09-07 NOTE — ED ADULT NURSE NOTE - OBJECTIVE STATEMENT
Pt presented to the ED with complaints of suicidal ideation. As per pt, she has been feeling unwell for the past 4-5 days, and starting yesterday she started having suicidal ideations. Pt was recently hospitalized for inpatient psychiatry and was doing well until 4-5 days ago, pt is currently on Zyprexa and Zoloft. Pt states that she is currently still having SI, "wants to jump out the window from her apartment". Pt is alert and oriented, denies chest pain, SOB, palpitations, lightheadedness, dizziness, fever, or vomiting.

## 2021-09-07 NOTE — BH INPATIENT PSYCHIATRY ASSESSMENT NOTE - NSBHCHARTREVIEWVS_PSY_A_CORE FT
Vital Signs Last 24 Hrs  T(C): 36.7 (09-07-21 @ 07:25), Max: 37.3 (09-07-21 @ 06:56)  T(F): 98.1 (09-07-21 @ 07:25), Max: 99.1 (09-07-21 @ 06:56)  HR: 95 (09-07-21 @ 07:25) (82 - 102)  BP: 110/71 (09-07-21 @ 07:25) (110/71 - 122/87)  BP(mean): --  RR: 18 (09-07-21 @ 07:25) (17 - 18)  SpO2: 98% (09-07-21 @ 07:25) (98% - 100%)

## 2021-09-07 NOTE — BH INPATIENT PSYCHIATRY ASSESSMENT NOTE - NSBHMETABOLIC_PSY_ALL_CORE_FT
BMI: BMI (kg/m2): 28.3 (09-07-21 @ 03:42)  HbA1c: A1C with Estimated Average Glucose Result: 4.9 % (09-07-21 @ 10:31)    Glucose:   BP: 110/71 (09-07-21 @ 07:25) (110/71 - 122/87)  Lipid Panel: Date/Time: 08-11-21 @ 08:36  Cholesterol, Serum: 146  Direct LDL: --  HDL Cholesterol, Serum: 57  Total Cholesterol/HDL Ration Measurement: --  Triglycerides, Serum: 55

## 2021-09-07 NOTE — BH INPATIENT PSYCHIATRY ASSESSMENT NOTE - NSREFERRALSOURCE_PSY_ALL_CORE
"Follow-up patient note    Physiatry (physical medicine and  Rehabilitation), interventional spine and sports medicine    Date of Service: 10/29/2019    Chief complaint: Low back pain    HISTORY    HPI: Martha Ordonez 73 y.o. female who presents today for evaluation of low back pain.     Since the last visit, she had been doing well and then she started having pain in the low back on left side.  This seems to radiates to the left groin.  She cannot identify anything that started the symptoms.  She feels like she did something and felt like she \"moved wrong.\"    She has some aching and stabbing in the left leg and sometimes she has noted a cramping in the left calf.  This has been gradually improving.  She took advil and methocarbamol.  This seemed to help.  She has stopped taking the methocarbamol.  This was from years ago.    She continues doing exercises once a day for 25 minutes.  Massages continue    She is not taking tylenol     Medical records review:  Previous treatments:    Physical Therapy: Yes    Medications the patient is tried: tylenol    Previous interventions: none     Previous surgeries to relieve the above pain:  none      ROS: Unchanged from previous visit 03/22/2019  Eyes:vision problems, glaucoma  CV:irregular heart rate  Resp:asthma  GI:GERD, pancreatitis  Psych:anxiety-induced depression  Endo:hyperparathryoidism?  Being followed by Endo    Red Flags ROS:   Fever, Chills, Sweats: Denies  Involuntary Weight Loss: Denies  Bladder Incontinence: Denies  Bowel Incontinence: denies  Saddle Anesthesia: Denies    All other systems reviewed and negative.       PMHx:   Past Medical History:   Diagnosis Date   • Abnormal thyroid function test 9/10/2014   • Allergic rhinitis    • Anxiety    • Asthma    • Back pain    • Basal cell carcinoma     squamos cell right ear   • Bronchitis    • Cardiac arrhythmia    • Depression    • Elevated liver enzymes 9/10/2014   • Family history of melanoma    • GERD " (gastroesophageal reflux disease)    • Glaucoma suspect of both eyes    • Hyperlipidemia    • Hypertension    • Nasal drainage    • OCD (obsessive compulsive disorder)    • Pancreatitis    • Parathyroid disorder (HCC)    • PCOS (polycystic ovarian syndrome)    • S/P ERCP    • Transient global amnesia        PSHx:   Past Surgical History:   Procedure Laterality Date   • ABDOMINAL HYSTERECTOMY TOTAL      ovaries remain   • ADENOIDECTOMY     • CARDIAC CATH, RIGHT HEART      normal, EKG was Abnormal    • CHOLECYSTECTOMY     • DILATION AND CURETTAGE     • EYE SURGERY      b/l iridotomy   • HYSTERECTOMY LAPAROSCOPY     • LUMPECTOMY      benign   • TONSILLECTOMY     • US-NEEDLE CORE BX-BREAST PANEL         Family history   Family History   Problem Relation Age of Onset   • Hypertension Mother    • Heart Disease Father         CHF   • Cancer Father         melanoma   • Hypertension Sister    • Stroke Sister    • Sleep Apnea Sister    • Cancer Paternal Grandfather         colon   • Heart Disease Paternal Grandfather    • Stroke Paternal Grandfather    • Diabetes Paternal Grandfather    • Other Maternal Grandmother         eplepsey   • Heart Attack Maternal Grandfather    • Stroke Maternal Grandfather    • Stroke Paternal Grandmother    • Sleep Apnea Sister          Medications:   Current Outpatient Medications   Medication   • olmesartan (BENICAR) 5 MG tablet   • albuterol (PROVENTIL) 2.5mg/3ml Nebu Soln solution for nebulization   • Cholecalciferol (VITAMIN D) 2000 UNIT Tab   • aspirin EC (ECOTRIN) 81 MG Tablet Delayed Response   • cetirizine (ZYRTEC) 10 MG TABS   • azelastine (OPTIVAR) 0.05 % ophthalmic solution   • Influenza Vac Split Quad (AFLURIA QUADRIVALENT IM)   • ondansetron (ZOFRAN ODT) 4 MG TABLET DISPERSIBLE   • fluconazole (DIFLUCAN) 150 MG tablet   • raNITidine (ZANTAC) 150 MG Tab   • Misc. Devices Misc   • VOLTAREN 1 % Gel   • acetaminophen (TYLENOL) 500 MG Tab     No current facility-administered medications  for this visit.        Allergies:   Allergies   Allergen Reactions   • Ace Inhibitors      Cough   • Ceclor [Cefaclor] Hives   • Ceftin Hives   • Cephalosporins Rash   • Ciprofloxacin Rash   • Kenalog Hives   • Lamisil [Terbinafine Hcl]    • Latex Rash   • Merthiolate [Thimerosal] Hives   • Monistat [Tioconazole] Itching   • Pcn [Penicillins] Hives   • Benzalkonium Chloride Rash   • Tetracyclines Rash       Social Hx:   Social History     Socioeconomic History   • Marital status:      Spouse name: Not on file   • Number of children: Not on file   • Years of education: Not on file   • Highest education level: Not on file   Occupational History   • Occupation: RN- retired   Social Needs   • Financial resource strain: Not on file   • Food insecurity:     Worry: Not on file     Inability: Not on file   • Transportation needs:     Medical: Not on file     Non-medical: Not on file   Tobacco Use   • Smoking status: Never Smoker   • Smokeless tobacco: Never Used   Substance and Sexual Activity   • Alcohol use: No     Alcohol/week: 0.0 oz   • Drug use: No   • Sexual activity: Yes     Partners: Male     Comment: , retired RN   Lifestyle   • Physical activity:     Days per week: Not on file     Minutes per session: Not on file   • Stress: Not on file   Relationships   • Social connections:     Talks on phone: Not on file     Gets together: Not on file     Attends [employer] service: Not on file     Active member of club or organization: Not on file     Attends meetings of clubs or organizations: Not on file     Relationship status: Not on file   • Intimate partner violence:     Fear of current or ex partner: Not on file     Emotionally abused: Not on file     Physically abused: Not on file     Forced sexual activity: Not on file   Other Topics Concern   •  Service No   • Blood Transfusions No   • Caffeine Concern No   • Occupational Exposure No   • Hobby Hazards No   • Sleep Concern Yes   • Stress Concern  "No   • Weight Concern Yes   • Special Diet Yes   • Back Care No   • Exercise Yes   • Bike Helmet Yes   • Seat Belt Yes   • Self-Exams Yes   Social History Narrative    , 2 children- daughters.          EXAMINATION     Physical Exam:   Vitals: /84 (BP Location: Left arm, Patient Position: Sitting, BP Cuff Size: Large adult long)   Pulse 74   Temp 36.1 °C (97 °F) (Temporal)   Ht 1.651 m (5' 5\")   Wt 76.2 kg (167 lb 15.9 oz)   SpO2 93%     Constitutional:   Body Habitus: Body mass index is 27.96 kg/m².  Cooperation: Fully cooperates with exam  Appearance: Well-groomed, well-nourished, not disheveled, no acute distress    Eyes: No scleral icterus, no proptosis     ENT -no obvious auditory deficits, no facial droop     Skin -no rashes or lesions noted     Respiratory-  breathing comfortable on room air, no audible wheezing    Cardiovascular- No lower extremity edema is noted.     Psychiatric- alert and oriented ×3. Normal affect.     Gait - normal gait, no use of ambulatory device, nonantalgic.      Musculoskeletal -     Thoracic/Lumbar Spine/Sacral Spine/Hips   Inspection: No evidence of atrophy in bilateral lower extremities throughout     Palpation:   palpation over SI joint: negative bilaterally    palpation over buttock: negative bilaterally       Lumbar spine Special tests  Neuro tension  Straight leg test negative bilaterally      Mild quadriceps tightness bilaterally    SI joint tests  LORETTA test negative bilaterally    Neuro       Key points for the international standards for neurological classification of spinal cord injury (ISNCSCI) to light touch.     Dermatome R L   L2 2 2   L3 2 2   L4 2 2   L5 2 2   S1 2 2   S2 2 2       Motor Exam Lower Extremities    ? Myotome R L   Hip flexion L2 5 5   Knee extension L3 5 5   Ankle dorsiflexion L4 5 5   Toe extension L5 5 5   Ankle plantarflexion S1 5 5       Reflexes  ?  R L   Patella  2+ 2+   Achilles   2+ 2+       MEDICAL DECISION MAKING    Medical " records review: see under HPI section.     DATA    Labs:   Lab Results   Component Value Date/Time    SODIUM 140 05/15/2019 10:52 AM    POTASSIUM 4.0 05/15/2019 10:52 AM    CHLORIDE 105 05/15/2019 10:52 AM    CO2 28 05/15/2019 10:52 AM    ANION 7.0 05/15/2019 10:52 AM    GLUCOSE 81 05/15/2019 10:52 AM    BUN 13 05/15/2019 10:52 AM    CREATININE 0.80 05/15/2019 10:52 AM    CALCIUM 9.5 05/15/2019 10:52 AM    ASTSGOT 25 05/15/2019 10:52 AM    ALTSGPT 21 05/15/2019 10:52 AM    TBILIRUBIN 0.8 05/15/2019 10:52 AM    ALBUMIN 4.1 05/15/2019 10:52 AM    TOTPROTEIN 7.0 05/15/2019 10:52 AM    GLOBULIN 2.9 05/15/2019 10:52 AM    AGRATIO 1.4 05/15/2019 10:52 AM       No results found for: PROTHROMBTM, INR     Lab Results   Component Value Date/Time    WBC 5.4 06/07/2018 10:22 AM    RBC 4.43 06/07/2018 10:22 AM    HEMOGLOBIN 14.0 06/07/2018 10:22 AM    HEMATOCRIT 41.7 06/07/2018 10:22 AM    MCV 94.1 06/07/2018 10:22 AM    MCH 31.6 06/07/2018 10:22 AM    MCHC 33.6 06/07/2018 10:22 AM    MPV 10.2 06/07/2018 10:22 AM    NEUTSPOLYS 50.20 06/07/2018 10:22 AM    LYMPHOCYTES 38.10 06/07/2018 10:22 AM    MONOCYTES 7.20 06/07/2018 10:22 AM    EOSINOPHILS 2.90 06/07/2018 10:22 AM    BASOPHILS 0.70 06/07/2018 10:22 AM        No results found for: HBA1C     Imaging: I personally reviewed following images, these are my reads -   Xray lumbar spine 02/20/2019  Mild dextroscoliosis centered at L2 which measure 13 degrees.  Mild decreased disc height L5-S1   Mild facet arthropathy and note of end-plate spurring at left L2-3 and L3-4 anteriorly.    Xray pelvis 02/20/2019  Normal sacroiliac joints  Mild acetabular spurring without significant hip joint narrowing bilaterally     IMAGING radiology reads. I reviewed the following radiology reads   Xray lumbar spine 02/20/2019  Mild upper lumbar dextroscoliosis with mild degenerative disc disease    Xray pelvis 02/20/2019  Mild bilateral acetabular spurring without joint space narrowing                                                                                                                                                                             Diagnosis   Visit Diagnoses     ICD-10-CM   1. Dextroscoliosis M41.80   2. DDD (degenerative disc disease), lumbar M51.36   3. Sacroiliac joint dysfunction M53.3   4. Lumbosacral pain M54.5           ASSESSMENT:  Martha Ordonez 73 y.o. female presents which symptoms suggestive of right sacroiliac joint dysfunction     Martha was seen today for follow-up.    Diagnoses and all orders for this visit:    Dextroscoliosis    DDD (degenerative disc disease), lumbar  -     REFERRAL TO PHYSICAL THERAPY Reason for Therapy: Eval/Treat/Report    Sacroiliac joint dysfunction  -     REFERRAL TO PHYSICAL THERAPY Reason for Therapy: Eval/Treat/Report    Lumbosacral pain  -     REFERRAL TO PHYSICAL THERAPY Reason for Therapy: Eval/Treat/Report           1. Referral to physical therapy  2. Discussed follow-up after she returns to physical therapy  3. No medications given today.  4. Note, she has had side effects that she has had after kenalog injections in the past.  We discussed that we will seek a non-invasive solution.      Follow-up: 2 months, prn if symptoms worsen        Please note that this dictation was created using voice recognition software. I have made every reasonable attempt to correct obvious errors but there may be errors of grammar and content that I may have overlooked prior to finalization of this note.      Stefan Lassiter MD  Physical Medicine and Rehabilitation  Interventional Spine and Sports Physiatry  [facility]        ED Admission

## 2021-09-07 NOTE — ED ADULT TRIAGE NOTE - ARRIVAL INFO ADDITIONAL COMMENTS
Patient reports that she was recently released from an in-patient psychiatric facility ans started on two anti depressant medications. Patient reports that she has been experiencing suicidal ideation since yesterday. Patient reports that she has the thought of jumping out of her apartment window at times.

## 2021-09-07 NOTE — ED BEHAVIORAL HEALTH ASSESSMENT NOTE - HPI (INCLUDE ILLNESS QUALITY, SEVERITY, DURATION, TIMING, CONTEXT, MODIFYING FACTORS, ASSOCIATED SIGNS AND SYMPTOMS)
30yo domiciled, , employed  F w/ hx of affective and psychotic symptoms w/ no past SA/SIB, with 2 prior psych hospitalizations who presents w/ worsening depressive and anxiety/PTSD like symptoms w/ SI w/ plan.      Pt was seen and evaluated via telemonitor. Patient spoke of how after last psych hosp [facility] she returned "back to [her] baseline." However, in the past week her depression and anxiety had worsened. She spoke of her anxiety symptoms being worse than her depression. She spoke of feeling "constantly thinking about" her traumatic experience at work despite attempts to avoid them. She spoke of feeling easily startled and hypervigilant. She stated that reminders about work (eg email from work) can trigger increased physical and emotional distress. She stated that this had been accompanied  by sense of depression, anhedonia, loss of sleep, loss of appetite, loss of interest. In the past 2 days she started to have suicidal thoughts w/ plan of "jumping out of the window" of her [address] [place] apartment. She denied desire or intent to die so she asked her wife to bring her back to the hospital. She denied manic symptoms inc FOI, dec need for sleep. Patient denied AH/VH/HI/intent or plan - reported resolution of paranoid thoughts, ideas of reference.       Collateral: see additional note from BT for collateral from her wife

## 2021-09-07 NOTE — ED BEHAVIORAL HEALTH ASSESSMENT NOTE - OTHER PAST PSYCHIATRIC HISTORY (INCLUDE DETAILS REGARDING ONSET, COURSE OF ILLNESS, INPATIENT/OUTPATIENT TREATMENT)
diagnoses of MDD w/ psychotic fx, no past SA/sib, 2 past psych hosp since July; no current outpatient provider - had been in opd at [facility] but wanted new provider but unable to get new ones

## 2021-09-07 NOTE — ED BEHAVIORAL HEALTH NOTE - BEHAVIORAL HEALTH NOTE
PRE-HOSPITAL COURSE  ===================  SOURCE:  Second-hand information via EMR documentation   DETAILS: Patient self-presented to the ED no additional pre-hospital course available    ===================  ED COURSE:   SOURCE:  Second-hand information via EMR documentation   ARRIVAL:  Patient self-presented to the ED with no noted incidents.  BELONGINGS:  Clothing   BEHAVIOR: Complied with triage protocols –provided blood/urine, changed into a hospital gown, and allowed staff to wand/collect belongings without incident. Patient endorsed SI with thoughts of jumping out of apartment window. Per chart, patient is noted to be depressed with mood congruent affect. She has been calm in the ED with no aggression or behavioral issues reported.   TREATMENT: No prn medications, restraints, security interventions or manual holds required.   VISITORS: Patient is accompanied by spouse    ========================  FOR EACH COLLATERAL  ========================  NAME: Joi Ortega   NUMBER: [phone]   RELATIONSHIP: Spouse   RELIABILITY: High   COMMENTS:     ========================  PATIENT DEMOGRAPHICS: 31-year-old, female, [place], , domiciled, employed as a medical resident, and a non-caregiver.   ========================  HPI  BASELINE FUNCTIONING: Patient completes ADLs daily, eats about 2-3 meals per day, and obtains 7-8 hours of sleep per night. Collateral described patient as someone who enjoys outdoor activities, spending time with family/friends, and playing the gu[employer]r. Collateral reported that patient is a medical resident and they are currently trying to pursue short-term disability due to pts recent hospital admissions. Patient is currently in treatment with a psychiatrist Dr. Vasquez; collateral is unable to provide list of patients medications at this time.     DATE HPI STARTED: ~ 1 week ago     DECOMPENSATION: Collateral relayed that over the past week patient has had increased anxiety and mood swings. She stated that patient has insomnia, poor appetite, and nausea. Collateral relayed that patient has been having intrusive thoughts and flashback about the death of on of her patients. Wife stated that over the past two days patient has been suicidal with a plan to jump out of the window. She stated that patient was schedule to have a review this week to see if she could return to work and that it may have been a possible trigger. Collateral reported that she believes patient is at acute risk for self-harm.     SUICIDALITY: Collateral stated that patient has been fixated on self-harm and wanting to jump out of their apartment window in attempt to end her life.     VIOLENCE:  Collateral denies     SUBSTANCE:  Collateral denies ?      ========================  PAST PSYCHIATRIC HISTORY  ========================  DATE PAST PSYCHIATRIC HISTORY STARTED: July 9/10 2021     MAIN PSYCHIATRIC DIAGNOSIS: Severe Depression with psychotic feature     PSYCHIATRIC HOSPITALIZATIONS:  2 prior IPP; last discharged from [facility] 8/17/21     PRIOR ILLNESS: hx of hallucinations, hx delusions     SUICIDALITY:  Collateral denies     VIOLENCE: hx of violence during psychotic episode      SUBSTANCE USE: Collateral denies     ==============  OTHER HISTORY  ==============  CURRENT MEDICATION:   Collateral is currently unable to provide list of patients medications    MEDICAL HISTORY:  None     ALLERGIES: Amoxicillin     LEGAL ISSUES: None reported    FIREARM ACCESS: No reported access to firearms or weapons    SOCIAL HISTORY: [place] came [place] the [place] 1 [place] ago for work. Collateral stated that patient experienced the traumatic death of one of her patients.    FAMILY HISTORY:  Paternal Aunt bipolar     COVID Exposure Screen- collateral (i.e. third-party, chart review, belongings, etc; include EMS and ED staff)  1.	*Has the patient had a COVID-19 test in the last 90 days?  ( X ) Yes   (  ) No   (  ) Unknown- Reason: _____  IF YES PROCEED TO QUESTION #2. IF NO OR UNKNOWN, PLEASE SKIP TO QUESTION #3.  2.	Date of test(s) and result(s): ____8/10/21 neg ____  3.	*Has the patient tested positive for COVID-19 antibodies? ( X ) Yes   (  ) No   (  ) Unknown- Reason: _____  IF YES PROCEED TO QUESTION #4. IF NO or UNKNOWN, PLEASE SKIP TO QUESTION #5.  4.	Date of positive antibody test: __8/10/21______  5.	*Has the patient received 2 doses of the COVID-19 vaccine? ( X ) Yes   (  ) No   (  ) Unknown- Reason: _____  IF YES PROCEED TO QUESTION #6. IF NO or UNKNOWN, PLEASE SKIP TO QUESTION #7.  6.	 Date of second dose: ___[employer] in the [place]_____  7.	*In the past 10 days, has the patient been around anyone with a positive COVID-19 test?* (  ) Yes   (  X) No   (  ) Unknown- Reason: __  IF YES PROCEED TO QUESTION #8. IF NO or UNKNOWN, PLEASE SKIP TO QUESTION #13.  8.	Was the patient within 6 feet of them for at least 15 minutes? (  ) Yes   (  ) No   (  ) Unknown- Reason: _____  9.	Did the patient provide care for them? (  ) Yes   (  ) No   (  ) Unknown- Reason: ______  10.	Did the patient have direct physical contact with them (touched, hugged, or kissed them)? (  ) Yes   (  ) No    (  ) Unknown- Reason: __  11.	Did the patient share eating or drinking utensils with them? (  ) Yes   (  ) No    (  ) Unknown- Reason: ____  12.	Did they sneeze, cough, or somehow get respiratory droplets on the patient? (  ) Yes   (  ) No    (  ) Unknown- Reason: ______  13.	*Has the patient been out of [place] within the past 10 days?* (  ) Yes   (X  ) No   (  ) Unknown- Reason: _____  IF YES PLEASE ANSWER THE FOLLOWING QUESTIONS:  14.	Which state/country did they go to? ______  15.	Were they there over 24 hours? (  ) Yes   (  ) No    (  ) Unknown- Reason: ______  16.	Date of return to [facility]: ______

## 2021-09-07 NOTE — BH INPATIENT PSYCHIATRY ASSESSMENT NOTE - DETAILS
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

## 2021-09-07 NOTE — ED ADULT NURSE NOTE - 
ADDITIONAL INFORMATION
Patient reports that she received [employer] in the [facility] [place].

## 2021-09-07 NOTE — ED BEHAVIORAL HEALTH ASSESSMENT NOTE - PATIENT'S CHIEF COMPLAINT
"I feel scared because I started to plan on how to kill myself. I don't want to die. I want to be happy and return to work."

## 2021-09-07 NOTE — BH INPATIENT PSYCHIATRY ASSESSMENT NOTE - HPI (INCLUDE ILLNESS QUALITY, SEVERITY, DURATION, TIMING, CONTEXT, MODIFYING FACTORS, ASSOCIATED SIGNS AND SYMPTOMS)
32y/o , domiciled, employed [employer] female with no pertinent medical diagnoses. Psychiatric hx significant for 2 prior hospitalizations (last [facility] 8/2021), diagnoses including MDD with psychosis, Brief psychiatric disorder, Anxiety d/o. Patient presents to ED complaining of worsening anxiety, panic and SI with plan to jump out of her apartment window.     Pt familiar to writer and staff from prior admission. She reports after being stabilized last month (during admission) on Zyprexa and zoloft she was doing fairly well, engaging with others, shopping, eating and sleeping well and anticipating going back to work and to her life. 6 days ago she reports experiencing worsening depression, panic attacks, anxiety coupled with nausea, blurry vision, sweating and urinary frequency which she describes as 'urinary incontinence' when she thinks about her job. She states than when she sees an email from her job she feels traumatized and describes her most recent psychotic episode as traumatizing to her. She appetite has been poor and she feels that she may have lost some weight within the last several days. Sleep has been unremarkable. She denies any psychotic symptoms since her first hospitalization, but feels that her 'mind is blocked.'     She began having suicidal thoughts 2 days  30y/o , domiciled, employed [employer] female with no pertinent medical diagnoses. Psychiatric hx significant for 2 prior hospitalizations (last [facility] 8/2021), diagnoses including MDD with psychosis, Brief psychiatric disorder, Anxiety d/o. Patient presents to ED complaining of worsening anxiety, panic and SI with plan to jump out of her apartment window.     Pt familiar to writer and staff from prior admission. She reports after being stabilized last month (during admission) on Zyprexa and zoloft she was doing fairly well, engaging with others, shopping, eating and sleeping well and anticipating going back to work and to her life. 6 days ago she reports experiencing worsening depression, panic attacks, anxiety coupled with nausea, blurry vision, sweating and urinary frequency which she describes as 'urinary incontinence' when she thinks about her job. She told her outpt psychiatrist, but nothing was done about this. She states that when she sees an email from her job she feels 'traumatized' and describes her most recent psychotic episode as traumatizing to her. Also feels that her interactions with her colleagues is somewhat harassing. Her appetite has been poor and she feels that she may have lost some weight within the last several days. Sleep has been unremarkable. She denies any psychotic symptoms since her first hospitalization, but feels that her 'mind is blocked.'     She began having suicidal thoughts 2 days ago with plan to jump out of the windows of her apartment building. Instead she recalled that she had her Safety plan from her most recent admission and attempted to her 'coping skills' and also called the suicide hotline. She did not friend the person on the call to be helpful so she came back to the hospital for admission. She denied any intent or desire to die, is future oriented, wanting to have a family with her wife and continue onwards with her profession. Gets very tearful at the idea that her illness may negatively impact her job and her license. She states that she feels that she is experiencing PTSD like symptoms, noted to be focused on Prazosin, though denies having nightmare related sxs.

## 2021-09-07 NOTE — ED BEHAVIORAL HEALTH ASSESSMENT NOTE - DESCRIPTION
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

## 2021-09-07 NOTE — ED PROVIDER NOTE - CLINICAL SUMMARY MEDICAL DECISION MAKING FREE TEXT BOX
pt w/hx of depression and "psychiatric break", was just admitted to psych - dc'd w/zyprexa and zoloft, pt claims compliance, today w/si - plans of jumping out of window and feels unsafe being home -- afraid that will hurt herself, psych clearance labs done, pt medically cleared, pt denies drugs/alcohol, psych consulted for eval and dispo pt w/hx of depression and "psychiatric break", was just admitted to psych - dc'd w/zyprexa and zoloft, pt claims compliance, today w/si - plans of jumping out of window and feels unsafe being home -- afraid that will hurt herself, psych clearance labs done, pt medically cleared, pt denies drugs/alcohol, psych consulted for eval and dispo -- will admit

## 2021-09-07 NOTE — ED BEHAVIORAL HEALTH ASSESSMENT NOTE - RISK ASSESSMENT
no hx of SA/SIB, current SI w/ plan but no intent, employed,  w/ social support and future goals Low Acute Suicide Risk

## 2021-09-07 NOTE — ED PROVIDER NOTE - ATTENDING CONTRIBUTION TO CARE
30 yo f presents to ED with concern for suicidal ideations - d/c'd from [facility] team 2 weeks ago.  Notes she is taking antidepressants as prescribed.  Has thoughts of jumping out of high rise building window.  Cannot sleep, is not eating.  On exam, patient is non toxic.  HRRR, lungs clear.  Abd soft, NT/ND.  Will medically clear and consult with psych team.

## 2021-09-07 NOTE — ED PROVIDER NOTE - PSYCHIATRIC, MLM
Alert and oriented to person, place, time/situation. flat affect, good eye contact, calm and cooperative

## 2021-09-07 NOTE — BH INPATIENT PSYCHIATRY ASSESSMENT NOTE - OTHER PAST PSYCHIATRIC HISTORY (INCLUDE DETAILS REGARDING ONSET, COURSE OF ILLNESS, INPATIENT/OUTPATIENT TREATMENT)
diagnoses of MDD w/ psychotic fx, no past SA/sib, 2 past psych hosp since July; no current outpatient provider - had been in [facility]d [facility] but wanted new provider but unable to get new ones

## 2021-09-07 NOTE — BH INPATIENT PSYCHIATRY ASSESSMENT NOTE - NSICDXBHSECONDARYDX_PSY_ALL_CORE
Post traumatic stress disorder   F43.10  Recurrent major depressive disorder, in partial remission   F33.41

## 2021-09-07 NOTE — ED BEHAVIORAL HEALTH ASSESSMENT NOTE - THOUGHT ASSOCIATIONS
Ambulated in hallway with oxygen at 2LPM.  Patient states feeling scared. States feeling slightly better. When ambulating back to room and sitting on bed, SaO2=86% but increases to 89% after about 20 seconds. Continue to monitor.      Merrick Madrid RN  08/09/18 5615
Dr. Linda Martinez at [facility]  08/09/18 7927
States having left rib pain for last 6 months but has not told any doctors. States getting this pain d/t coughing. States feeling like he is getting a sinus infection. Reports having brown and clear sinus drainage.      Coty Ospina, RN  08/09/18 [id number]
Normal

## 2021-09-07 NOTE — BH INPATIENT PSYCHIATRY ASSESSMENT NOTE - CURRENT MEDICATION
MEDICATIONS  (STANDING):  OLANZapine 10 milliGRAM(s) Oral at bedtime  sertraline 75 milliGRAM(s) Oral daily    MEDICATIONS  (PRN):   MEDICATIONS  (STANDING):  hydrOXYzine hydrochloride 50 milliGRAM(s) Oral two times a day  mirtazapine 7.5 milliGRAM(s) Oral at bedtime  OLANZapine 10 milliGRAM(s) Oral at bedtime    MEDICATIONS  (PRN):  acetaminophen   Tablet .. 650 milliGRAM(s) Oral every 6 hours PRN Moderate Pain (4 - 6)  aluminum hydroxide/magnesium hydroxide/simethicone Suspension 30 milliLiter(s) Oral every 6 hours PRN Dyspepsia

## 2021-09-07 NOTE — ED BEHAVIORAL HEALTH ASSESSMENT NOTE - SUMMARY
30yo domiciled, , employed  F w/ hx of affective, psychotic symptoms w/ no past SA/SIB, w/ 2 past psych hosp presents w/ worsening anxiety and PTSD symptoms w/ associated depressive mood w/ SI w/ plan but without intent. It's unclear if her presentation represents an underlying PTSD exacerbation w/ associated affective symptoms or if she's experiencing a depressive episode in conjunction w ongoing PTSD symptoms. Pt reports desire to return to inpatient psych unit to seek further stabilization of her affective and anxiety symptoms.

## 2021-09-07 NOTE — ED BEHAVIORAL HEALTH ASSESSMENT NOTE - DETAILS
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

## 2021-09-07 NOTE — BH PATIENT PROFILE - HOME MEDICATIONS
mirtazapine 7.5 mg oral tablet , 1 tab(s) orally once a day (at bedtime), As needed, insomnia  sertraline 50 mg oral tablet , 1 tab(s) orally once a day  OLANZapine 10 mg oral tablet , 1 tab(s) orally once a day (at bedtime)  pantoprazole 40 mg oral delayed release tablet , 1 tab(s) orally once a day (before a meal)

## 2021-09-07 NOTE — ED PROVIDER NOTE - IV ALTEPLASE EXCL ABS HIDDEN
Dr. Slade notified of results, pt went to ER
The patient had  CT scan of head without contrast. Dx. Fall. CT result showed acute intracranial hemorrhage . Received order from Dr. Slade to send to ER. The patient is alert and oriented times 4, verbally responsive. MAEW. Only c/o is feeling tired latety. The patient and his brother brought to the ER for further testing.   
show

## 2021-09-07 NOTE — ED BEHAVIORAL HEALTH ASSESSMENT NOTE - PSYCHIATRIC ISSUES AND PLAN (INCLUDE STANDING AND PRN MEDICATION)
titrate up zoloft to 75mg po qdaily; primary psych team need to consider adding prazosin (if BP able to tolerate); should consider tapering zyprexa dose given her reports of daytime sedation

## 2021-09-07 NOTE — BH INPATIENT PSYCHIATRY ASSESSMENT NOTE - RISK ASSESSMENT
Patient has had two hospitalizations within 2 months, has strong support from wife, is future oriented and educated. Has good insight into illness and need for treatment

## 2021-09-07 NOTE — ED PROVIDER NOTE - OBJECTIVE STATEMENT
The pt is a 30 y/o F, who presents to ED w/spouse, c/o SI -- plans on jumping out of the window ([address] fl). Was just dc'd from hosp about 2 wks, on zyprexa and zoloft, has been compliant w/meds. States that does not feel safe at home, "I'm afraid of what I may do", + decreased appetite. Denies anorexia, HI, VH, AH, cp, sob, n/v/d, abd pain, fevers, chills.

## 2021-09-08 LAB
CHOLEST SERPL-MCNC: 184 MG/DL — SIGNIFICANT CHANGE UP
HDLC SERPL-MCNC: 61 MG/DL — SIGNIFICANT CHANGE UP
LIPID PNL WITH DIRECT LDL SERPL: 108 MG/DL — HIGH
NON HDL CHOLESTEROL: 123 MG/DL — SIGNIFICANT CHANGE UP
TRIGL SERPL-MCNC: 77 MG/DL — SIGNIFICANT CHANGE UP

## 2021-09-08 PROCEDURE — 99233 SBSQ HOSP IP/OBS HIGH 50: CPT

## 2021-09-08 RX ADMIN — OLANZAPINE 10 MILLIGRAM(S): 15 TABLET, FILM COATED ORAL at 21:56

## 2021-09-08 RX ADMIN — MIRTAZAPINE 7.5 MILLIGRAM(S): 45 TABLET, ORALLY DISINTEGRATING ORAL at 21:56

## 2021-09-08 RX ADMIN — SERTRALINE 100 MILLIGRAM(S): 25 TABLET, FILM COATED ORAL at 10:12

## 2021-09-08 RX ADMIN — Medication 50 MILLIGRAM(S): at 10:13

## 2021-09-08 RX ADMIN — Medication 50 MILLIGRAM(S): at 21:56

## 2021-09-08 NOTE — BH INPATIENT PSYCHIATRY PROGRESS NOTE - NSBHFUPINTERVALHXFT_PSY_A_CORE
Patient visible on the unit, seen attending multiple groups throughout the day. Had a visit from wife today, writer spoke with wife who shared concerns regarding pt. Pt reported feeling less anxious today, received new dose of zoloft 100mg as well as atarax 50mg which she finds helpful. Denies current si/hi. Sleep and appetite are both fair. Appears blunted

## 2021-09-08 NOTE — CHART NOTE - NSCHARTNOTEFT_GEN_A_CORE
PHYSICAL EXAM: Agree    VITALS: T(C): 36.9 (09-08-21 @ 17:00), Max: 36.9 (09-08-21 @ 08:22)  HR: 91 (09-08-21 @ 17:00) (91 - 98)  BP: 108/72 (09-08-21 @ 17:00) (108/72 - 111/73)  RR: 18 (09-08-21 @ 17:00) (18 - 18)  SpO2: 100% (09-08-21 @ 17:00) (98% - 100%)      GENERAL: NAD, comfortable, ambulating  HEAD:  Atraumatic, Normocephalic  EYES: EOMI, PERRLA, conjunctiva and sclera clear  ENT: Moist mucous membranes, no palpable lymphadenopathy  NECK: Supple, No JVD  CHEST/LUNG: Clear to auscultation bilaterally; No rales, rhonchi, wheezing, or rubs. Unlabored respirations  HEART: Regular rate and rhythm; No rubs, or gallops; 1-2/6 diastolic murmur at aortic valve  ABDOMEN: BSx4; Soft, nontender, nondistended  EXTREMITIES:  No clubbing, cyanosis, or edema  MUSCULOSKELETAL: 5/5 strength in UE and LE; no protonator drift; normal gait  REFLEXES: brisk 3+ patellar, brachial, brachioradialis, triceps; 2+ Achilles  NERVOUS SYSTEM:  A&Ox3, no focal deficits, CN II-XII grossly intact, rapid finger movement intact, sensation intact and equal bilaterally at V1V2V3 UE and LE regions  SKIN: No rashes or lesions

## 2021-09-09 PROCEDURE — 99233 SBSQ HOSP IP/OBS HIGH 50: CPT

## 2021-09-09 RX ORDER — PANTOPRAZOLE SODIUM 20 MG/1
40 TABLET, DELAYED RELEASE ORAL
Refills: 0 | Status: DISCONTINUED | OUTPATIENT
Start: 2021-09-09 | End: 2021-09-15

## 2021-09-09 RX ADMIN — Medication 50 MILLIGRAM(S): at 21:59

## 2021-09-09 RX ADMIN — MIRTAZAPINE 7.5 MILLIGRAM(S): 45 TABLET, ORALLY DISINTEGRATING ORAL at 21:59

## 2021-09-09 RX ADMIN — OLANZAPINE 10 MILLIGRAM(S): 15 TABLET, FILM COATED ORAL at 21:59

## 2021-09-09 RX ADMIN — SERTRALINE 100 MILLIGRAM(S): 25 TABLET, FILM COATED ORAL at 09:42

## 2021-09-09 RX ADMIN — Medication 50 MILLIGRAM(S): at 09:42

## 2021-09-09 NOTE — BH INPATIENT PSYCHIATRY PROGRESS NOTE - NSBHFUPINTERVALHXFT_PSY_A_CORE
Patient seen in her room today, appropriate upon approach. She endorses low mood and feelings of depression today in addition to fleeting thoughts of suicide with no intent or plan.  Patient seen in her room today, appropriate upon approach. She endorses low mood and feelings of depression today in addition to fleeting thoughts of suicide with no intent or plan. Feels safe here on the unit. Has been going to groups and interacting well with others, finds that the interactions and distractions help to take her mind off of her low mood. Feels comfortable with medication regimen, ok with further titration of medications. Future oriented, wanting to go back to work. Reports decrease in level of anxiety.  Denies hi, a/v h, or paranoid ideation.

## 2021-09-10 DIAGNOSIS — F60.9 PERSONALITY DISORDER, UNSPECIFIED: ICD-10-CM

## 2021-09-10 PROCEDURE — 99232 SBSQ HOSP IP/OBS MODERATE 35: CPT

## 2021-09-10 RX ADMIN — Medication 50 MILLIGRAM(S): at 10:09

## 2021-09-10 RX ADMIN — OLANZAPINE 10 MILLIGRAM(S): 15 TABLET, FILM COATED ORAL at 21:29

## 2021-09-10 RX ADMIN — MIRTAZAPINE 7.5 MILLIGRAM(S): 45 TABLET, ORALLY DISINTEGRATING ORAL at 21:28

## 2021-09-10 RX ADMIN — SERTRALINE 100 MILLIGRAM(S): 25 TABLET, FILM COATED ORAL at 10:09

## 2021-09-10 RX ADMIN — Medication 50 MILLIGRAM(S): at 21:28

## 2021-09-10 RX ADMIN — PANTOPRAZOLE SODIUM 40 MILLIGRAM(S): 20 TABLET, DELAYED RELEASE ORAL at 07:32

## 2021-09-10 NOTE — PSYCHIATRIC REHAB INITIAL EVALUATION - NSBHPRTOOLBOX_PSY_ALL_CORE
Pt reports enjoying board games, playing the [employer], movies, travel, and spending time with her family/Entertainment/Family support/Music

## 2021-09-10 NOTE — BH INPATIENT PSYCHIATRY PROGRESS NOTE - NSBHFUPINTERVALHXFT_PSY_A_CORE
Patient visible on the unit, seen attending groups, appears to be appropriately interacting with others. Upon approach, pt noted to be dismissive states that she already spoke with Dr. Landis this morning regarding treatment plan etc and walked away from writer. Per conversation with Attending, pt continued to express SI which she found to be concerning. Pt has also expressed desire to other staff members for continued titration of medications to address her symptoms.

## 2021-09-11 PROCEDURE — 99231 SBSQ HOSP IP/OBS SF/LOW 25: CPT

## 2021-09-11 RX ORDER — IBUPROFEN 200 MG
600 TABLET ORAL EVERY 8 HOURS
Refills: 0 | Status: DISCONTINUED | OUTPATIENT
Start: 2021-09-11 | End: 2021-09-15

## 2021-09-11 RX ADMIN — Medication 50 MILLIGRAM(S): at 21:30

## 2021-09-11 RX ADMIN — Medication 2 MILLIGRAM(S): at 04:13

## 2021-09-11 RX ADMIN — Medication 50 MILLIGRAM(S): at 10:07

## 2021-09-11 RX ADMIN — MIRTAZAPINE 7.5 MILLIGRAM(S): 45 TABLET, ORALLY DISINTEGRATING ORAL at 21:37

## 2021-09-11 RX ADMIN — SERTRALINE 100 MILLIGRAM(S): 25 TABLET, FILM COATED ORAL at 10:06

## 2021-09-11 RX ADMIN — OLANZAPINE 10 MILLIGRAM(S): 15 TABLET, FILM COATED ORAL at 21:30

## 2021-09-11 RX ADMIN — PANTOPRAZOLE SODIUM 40 MILLIGRAM(S): 20 TABLET, DELAYED RELEASE ORAL at 07:25

## 2021-09-11 NOTE — BH INPATIENT PSYCHIATRY PROGRESS NOTE - NSBHFUPINTERVALHXFT_PSY_A_CORE
Pt. seen, chart reviewed. Pt seen individually, she is sitting on her bed, dressed in her own clothing, calm and cooperative.  Pt. reports her mood today as "I think I feel better." Pt. reports poor sleep last PM stating she woke up around 3am and took an Ativan with good effect. When asked about SI pt responds "not for now." Pt. denies HI/AH/VH. Pt. denies current active SI. No side effects to medications reported or observed.

## 2021-09-12 RX ADMIN — PANTOPRAZOLE SODIUM 40 MILLIGRAM(S): 20 TABLET, DELAYED RELEASE ORAL at 07:12

## 2021-09-12 RX ADMIN — OLANZAPINE 10 MILLIGRAM(S): 15 TABLET, FILM COATED ORAL at 21:26

## 2021-09-12 RX ADMIN — SERTRALINE 100 MILLIGRAM(S): 25 TABLET, FILM COATED ORAL at 09:36

## 2021-09-12 RX ADMIN — MIRTAZAPINE 7.5 MILLIGRAM(S): 45 TABLET, ORALLY DISINTEGRATING ORAL at 21:26

## 2021-09-12 RX ADMIN — Medication 50 MILLIGRAM(S): at 21:26

## 2021-09-12 RX ADMIN — Medication 50 MILLIGRAM(S): at 09:35

## 2021-09-13 PROCEDURE — 99232 SBSQ HOSP IP/OBS MODERATE 35: CPT

## 2021-09-13 RX ADMIN — MIRTAZAPINE 7.5 MILLIGRAM(S): 45 TABLET, ORALLY DISINTEGRATING ORAL at 21:13

## 2021-09-13 RX ADMIN — OLANZAPINE 10 MILLIGRAM(S): 15 TABLET, FILM COATED ORAL at 21:13

## 2021-09-13 RX ADMIN — SERTRALINE 100 MILLIGRAM(S): 25 TABLET, FILM COATED ORAL at 10:21

## 2021-09-13 RX ADMIN — PANTOPRAZOLE SODIUM 40 MILLIGRAM(S): 20 TABLET, DELAYED RELEASE ORAL at 07:28

## 2021-09-13 RX ADMIN — Medication 50 MILLIGRAM(S): at 21:12

## 2021-09-13 NOTE — BH PSYCHOLOGY - CLINICIAN PSYCHOTHERAPY NOTE - NSBHPSYCHOLPROBS_PSY_ALL_CORE
Katherine    Pt calling to have change of pharmacy addressed.  Pt wants losartan anf hydralazine sent to [facility] in [place], permanent change.  Pt has 1 losartan for tonite and about 4 hydralizine remaining.    She wants to  the meds in the morning.    Please call Priscilla if questions .    
Anxiety/Depression/Suicidality
Anxiety/Depression/Suicidality

## 2021-09-13 NOTE — BH INPATIENT PSYCHIATRY PROGRESS NOTE - NSBHFUPINTERVALHXFT_PSY_A_CORE
Patient visible on the unit, seen attending groups this morning, is seen today by writer in her room. Initially resting, but is alert and cooperative upon approach stating 'I'm better now.' States that her si has resolved and she hasn't had them in a few days. Is future oriented and motivated for treatment. No acute medical issues, states that she had some dizziness earlier this morning, but that has since resolved. Sleep is fair, appetite continues to improve. She has been debating on returning to [place] to be with her parents before returning to work or staying in [place] to pursue treatment. She is agreeable to a partial hospitalization program and thinks that this would be appropriate for her.  Anxiety has improved during hospitalization, still endorses some depression.

## 2021-09-13 NOTE — BH PSYCHOLOGY - CLINICIAN PSYCHOTHERAPY NOTE - NSBHPSYCHOLGOALS_PSY_A_CORE
Decrease symptoms/Assessment/Improve family functioning/Psychoeducation
Decrease symptoms/Assessment/Improve family functioning/Psychoeducation

## 2021-09-13 NOTE — BH PSYCHOLOGY - CLINICIAN PSYCHOTHERAPY NOTE - NSBHPSYCHOLINT_PSY_A_CORE
Problem-solving techniques discussed/Stress management/Supportive therapy
Stress management/Supported coping skills/Supportive therapy

## 2021-09-13 NOTE — BH INPATIENT PSYCHIATRY PROGRESS NOTE - NSBHCONSBHPROVCNTCTNOFT_PSY_A_CORE
Will attempt to contact MD during pt's admission
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
## 2021-09-13 NOTE — BH PSYCHOLOGY - CLINICIAN PSYCHOTHERAPY NOTE - NSTXDEPRESGOAL_PSY_ALL_CORE
Exhibit improvements in self-grooming, hygiene, sleep and appetite
Attend and participate in at least 2 groups daily despite low mood/energy

## 2021-09-13 NOTE — BH PSYCHOLOGY - CLINICIAN PSYCHOTHERAPY NOTE - NSTXANXGOAL_PSY_ALL_CORE
Report a reduction in panic attacks and improving mood and confidence
Be able to perform ADLs and maintain safety despite anxiety/panic daily

## 2021-09-13 NOTE — BH PSYCHOLOGY - CLINICIAN PSYCHOTHERAPY NOTE - NSTXPOTSDGOAL_PSY_ALL_CORE
Will be able to learn and utilize relaxation and deep breathing skills to manage distress
Will identify 1 adaptive coping skill to manage symptoms of PTSD

## 2021-09-14 PROCEDURE — 99233 SBSQ HOSP IP/OBS HIGH 50: CPT

## 2021-09-14 RX ORDER — MIRTAZAPINE 45 MG/1
7.5 TABLET, ORALLY DISINTEGRATING ORAL AT BEDTIME
Refills: 0 | Status: DISCONTINUED | OUTPATIENT
Start: 2021-09-14 | End: 2021-09-15

## 2021-09-14 RX ADMIN — OLANZAPINE 10 MILLIGRAM(S): 15 TABLET, FILM COATED ORAL at 21:32

## 2021-09-14 RX ADMIN — SERTRALINE 100 MILLIGRAM(S): 25 TABLET, FILM COATED ORAL at 09:42

## 2021-09-14 RX ADMIN — Medication 50 MILLIGRAM(S): at 09:42

## 2021-09-14 RX ADMIN — PANTOPRAZOLE SODIUM 40 MILLIGRAM(S): 20 TABLET, DELAYED RELEASE ORAL at 07:20

## 2021-09-14 RX ADMIN — Medication 50 MILLIGRAM(S): at 21:31

## 2021-09-14 NOTE — BH INPATIENT PSYCHIATRY PROGRESS NOTE - NSBHMETABOLICLABS_PSY_ALL_CORE
Labs within last 12 months
All labs not within last 12 months, not ordered
Labs within last 12 months

## 2021-09-14 NOTE — BH INPATIENT PSYCHIATRY PROGRESS NOTE - CURRENT MEDICATION
MEDICATIONS  (STANDING):  hydrOXYzine hydrochloride 50 milliGRAM(s) Oral two times a day  mirtazapine 7.5 milliGRAM(s) Oral at bedtime  OLANZapine 10 milliGRAM(s) Oral at bedtime  pantoprazole    Tablet 40 milliGRAM(s) Oral before breakfast  sertraline 100 milliGRAM(s) Oral daily    MEDICATIONS  (PRN):  acetaminophen   Tablet .. 650 milliGRAM(s) Oral every 6 hours PRN Moderate Pain (4 - 6)  aluminum hydroxide/magnesium hydroxide/simethicone Suspension 30 milliLiter(s) Oral every 6 hours PRN Dyspepsia  LORazepam     Tablet 2 milliGRAM(s) Oral every 6 hours PRN severe anxiety  
MEDICATIONS  (STANDING):  hydrOXYzine hydrochloride 50 milliGRAM(s) Oral two times a day  mirtazapine 7.5 milliGRAM(s) Oral at bedtime  OLANZapine 10 milliGRAM(s) Oral at bedtime  pantoprazole    Tablet 40 milliGRAM(s) Oral before breakfast  sertraline 100 milliGRAM(s) Oral daily    MEDICATIONS  (PRN):  acetaminophen   Tablet .. 650 milliGRAM(s) Oral every 6 hours PRN Moderate Pain (4 - 6)  aluminum hydroxide/magnesium hydroxide/simethicone Suspension 30 milliLiter(s) Oral every 6 hours PRN Dyspepsia  LORazepam     Tablet 2 milliGRAM(s) Oral every 6 hours PRN severe anxiety  
MEDICATIONS  (STANDING):  hydrOXYzine hydrochloride 50 milliGRAM(s) Oral two times a day  mirtazapine 7.5 milliGRAM(s) Oral at bedtime  OLANZapine 10 milliGRAM(s) Oral at bedtime  sertraline 100 milliGRAM(s) Oral daily    MEDICATIONS  (PRN):  acetaminophen   Tablet .. 650 milliGRAM(s) Oral every 6 hours PRN Moderate Pain (4 - 6)  aluminum hydroxide/magnesium hydroxide/simethicone Suspension 30 milliLiter(s) Oral every 6 hours PRN Dyspepsia  LORazepam     Tablet 2 milliGRAM(s) Oral every 6 hours PRN severe anxiety  
MEDICATIONS  (STANDING):  hydrOXYzine hydrochloride 50 milliGRAM(s) Oral two times a day  mirtazapine 7.5 milliGRAM(s) Oral at bedtime  OLANZapine 10 milliGRAM(s) Oral at bedtime  sertraline 100 milliGRAM(s) Oral daily    MEDICATIONS  (PRN):  acetaminophen   Tablet .. 650 milliGRAM(s) Oral every 6 hours PRN Moderate Pain (4 - 6)  aluminum hydroxide/magnesium hydroxide/simethicone Suspension 30 milliLiter(s) Oral every 6 hours PRN Dyspepsia  LORazepam     Tablet 2 milliGRAM(s) Oral every 6 hours PRN severe anxiety  
MEDICATIONS  (STANDING):  hydrOXYzine hydrochloride 50 milliGRAM(s) Oral two times a day  mirtazapine 7.5 milliGRAM(s) Oral at bedtime  OLANZapine 10 milliGRAM(s) Oral at bedtime  pantoprazole    Tablet 40 milliGRAM(s) Oral before breakfast  sertraline 100 milliGRAM(s) Oral daily    MEDICATIONS  (PRN):  acetaminophen   Tablet .. 650 milliGRAM(s) Oral every 6 hours PRN Moderate Pain (4 - 6)  aluminum hydroxide/magnesium hydroxide/simethicone Suspension 30 milliLiter(s) Oral every 6 hours PRN Dyspepsia  ibuprofen  Tablet. 600 milliGRAM(s) Oral every 8 hours PRN Severe Pain (7 - 10)  LORazepam     Tablet 2 milliGRAM(s) Oral every 6 hours PRN severe anxiety  
MEDICATIONS  (STANDING):  hydrOXYzine hydrochloride 50 milliGRAM(s) Oral two times a day  mirtazapine 7.5 milliGRAM(s) Oral at bedtime  OLANZapine 10 milliGRAM(s) Oral at bedtime  pantoprazole    Tablet 40 milliGRAM(s) Oral before breakfast  sertraline 100 milliGRAM(s) Oral daily    MEDICATIONS  (PRN):  acetaminophen   Tablet .. 650 milliGRAM(s) Oral every 6 hours PRN Moderate Pain (4 - 6)  aluminum hydroxide/magnesium hydroxide/simethicone Suspension 30 milliLiter(s) Oral every 6 hours PRN Dyspepsia  ibuprofen  Tablet. 600 milliGRAM(s) Oral every 8 hours PRN Severe Pain (7 - 10)  LORazepam     Tablet 2 milliGRAM(s) Oral every 6 hours PRN severe anxiety

## 2021-09-14 NOTE — BH INPATIENT PSYCHIATRY PROGRESS NOTE - NSICDXBHPRIMARYDX_PSY_ALL_CORE
Recurrent major depressive disorder, in partial remission   F33.41  

## 2021-09-14 NOTE — BH INPATIENT PSYCHIATRY PROGRESS NOTE - NSBHMETABOLIC_PSY_ALL_CORE_FT
BMI: BMI (kg/m2): 28.3 (09-07-21 @ 03:42)  HbA1c: A1C with Estimated Average Glucose Result: 4.9 % (09-07-21 @ 10:31)    Glucose:   BP: 107/72 (09-10-21 @ 09:00) (107/72 - 120/87)  Lipid Panel: Date/Time: 09-08-21 @ 08:24  Cholesterol, Serum: 184  Direct LDL: --  HDL Cholesterol, Serum: 61  Total Cholesterol/HDL Ration Measurement: --  Triglycerides, Serum: 77  
BMI: BMI (kg/m2): 28.3 (09-07-21 @ 03:42)  HbA1c: A1C with Estimated Average Glucose Result: 4.9 % (09-07-21 @ 10:31)    Glucose:   BP: 125/79 (09-14-21 @ 08:25) (117/79 - 161/78)  Lipid Panel: Date/Time: 09-08-21 @ 08:24  Cholesterol, Serum: 184  Direct LDL: --  HDL Cholesterol, Serum: 61  Total Cholesterol/HDL Ration Measurement: --  Triglycerides, Serum: 77  
BMI: BMI (kg/m2): 28.3 (09-07-21 @ 03:42)  HbA1c: A1C with Estimated Average Glucose Result: 4.9 % (09-07-21 @ 10:31)    Glucose:   BP: 119/68 (09-09-21 @ 09:12) (108/72 - 122/87)  Lipid Panel: Date/Time: 09-08-21 @ 08:24  Cholesterol, Serum: 184  Direct LDL: --  HDL Cholesterol, Serum: 61  Total Cholesterol/HDL Ration Measurement: --  Triglycerides, Serum: 77  
BMI: BMI (kg/m2): 28.3 (09-07-21 @ 03:42)  HbA1c: A1C with Estimated Average Glucose Result: 4.9 % (09-07-21 @ 10:31)    Glucose:   BP: 120/75 (09-13-21 @ 08:22) (117/79 - 129/92)  Lipid Panel: Date/Time: 09-08-21 @ 08:24  Cholesterol, Serum: 184  Direct LDL: --  HDL Cholesterol, Serum: 61  Total Cholesterol/HDL Ration Measurement: --  Triglycerides, Serum: 77  
BMI: BMI (kg/m2): 28.3 (09-07-21 @ 03:42)  HbA1c: A1C with Estimated Average Glucose Result: 4.9 % (09-07-21 @ 10:31)    Glucose:   BP: 111/73 (09-08-21 @ 08:22) (110/71 - 122/87)  Lipid Panel: Date/Time: 09-08-21 @ 08:24  Cholesterol, Serum: 184  Direct LDL: --  HDL Cholesterol, Serum: 61  Total Cholesterol/HDL Ration Measurement: --  Triglycerides, Serum: 77  
BMI: BMI (kg/m2): 28.3 (09-07-21 @ 03:42)  HbA1c: A1C with Estimated Average Glucose Result: 4.9 % (09-07-21 @ 10:31)    Glucose:   BP: 121/78 (09-11-21 @ 08:45) (107/72 - 128/85)  Lipid Panel: Date/Time: 09-08-21 @ 08:24  Cholesterol, Serum: 184  Direct LDL: --  HDL Cholesterol, Serum: 61  Total Cholesterol/HDL Ration Measurement: --  Triglycerides, Serum: 77

## 2021-09-14 NOTE — BH INPATIENT PSYCHIATRY PROGRESS NOTE - NSTXANXGOAL_PSY_ALL_CORE
Be able to participate in activities despite lingering anxiety/panic
Report a reduction in panic attacks and improving mood and confidence
Be able to perform ADLs and maintain safety despite anxiety/panic daily
Report a reduction in panic attacks and improving mood and confidence
Report a reduction in panic attacks and improving mood and confidence
Be able to perform ADLs and maintain safety despite anxiety/panic daily

## 2021-09-14 NOTE — BH INPATIENT PSYCHIATRY PROGRESS NOTE - NSTXDEPRESGOAL_PSY_ALL_CORE
Attend and participate in at least 2 groups daily despite low mood/energy
Attend and participate in at least 2 groups daily despite low mood/energy
Exhibit improvements in self-grooming, hygiene, sleep and appetite
Attend and participate in at least 2 groups daily despite low mood/energy
Exhibit improvements in self-grooming, hygiene, sleep and appetite
Attend and participate in at least 2 groups daily despite low mood/energy

## 2021-09-14 NOTE — BH INPATIENT PSYCHIATRY PROGRESS NOTE - NSBHFUPINTERVALCCFT_PSY_A_CORE
"I think I feel better." 
I'm better now
I'm feeling depressed today
I already spoke to Dr. Landis.
I feel less anxious
I feel dizzy

## 2021-09-14 NOTE — BH INPATIENT PSYCHIATRY PROGRESS NOTE - NSBHCHARTREVIEWVS_PSY_A_CORE FT
Vital Signs Last 24 Hrs  T(C): 36.9 (09-09-21 @ 09:12), Max: 36.9 (09-08-21 @ 17:00)  T(F): 98.5 (09-09-21 @ 09:12), Max: 98.5 (09-09-21 @ 09:12)  HR: 90 (09-09-21 @ 09:12) (90 - 91)  BP: 119/68 (09-09-21 @ 09:12) (108/72 - 119/68)  BP(mean): --  RR: 18 (09-08-21 @ 17:00) (18 - 18)  SpO2: 97% (09-09-21 @ 09:12) (97% - 100%)    
Vital Signs Last 24 Hrs  T(C): 36.4 (09-11-21 @ 08:45), Max: 36.8 (09-10-21 @ 16:00)  T(F): 97.6 (09-11-21 @ 08:45), Max: 98.2 (09-10-21 @ 16:00)  HR: 101 (09-11-21 @ 08:45) (89 - 101)  BP: 121/78 (09-11-21 @ 08:45) (121/78 - 128/85)  BP(mean): --  RR: 18 (09-11-21 @ 08:45) (18 - 18)  SpO2: 99% (09-11-21 @ 08:45) (99% - 99%)    
Vital Signs Last 24 Hrs  T(C): 36.8 (09-10-21 @ 09:00), Max: 36.8 (09-10-21 @ 09:00)  T(F): 98.3 (09-10-21 @ 09:00), Max: 98.3 (09-10-21 @ 09:00)  HR: 87 (09-10-21 @ 09:00) (78 - 87)  BP: 107/72 (09-10-21 @ 09:00) (107/72 - 114/77)  BP(mean): --  RR: 18 (09-10-21 @ 09:00) (18 - 18)  SpO2: 97% (09-10-21 @ 09:00) (97% - 100%)    
Vital Signs Last 24 Hrs  T(C): 36.9 (09-08-21 @ 08:22), Max: 36.9 (09-08-21 @ 08:22)  T(F): 98.4 (09-08-21 @ 08:22), Max: 98.4 (09-08-21 @ 08:22)  HR: 98 (09-08-21 @ 08:22) (89 - 98)  BP: 111/73 (09-08-21 @ 08:22) (111/73 - 120/87)  BP(mean): --  RR: 18 (09-08-21 @ 08:22) (18 - 19)  SpO2: 98% (09-08-21 @ 08:22) (98% - 99%)    
Vital Signs Last 24 Hrs  T(C): 36.8 (09-14-21 @ 08:25), Max: 36.8 (09-13-21 @ 17:14)  T(F): 98.2 (09-14-21 @ 08:25), Max: 98.2 (09-13-21 @ 17:14)  HR: 102 (09-14-21 @ 08:25) (64 - 102)  BP: 125/79 (09-14-21 @ 08:25) (125/79 - 161/78)  BP(mean): --  RR: 18 (09-14-21 @ 08:25) (16 - 18)  SpO2: 98% (09-14-21 @ 08:25) (97% - 98%)    
Vital Signs Last 24 Hrs  T(C): 36.7 (09-13-21 @ 08:22), Max: 36.8 (09-12-21 @ 16:19)  T(F): 98 (09-13-21 @ 08:22), Max: 98.3 (09-12-21 @ 16:19)  HR: 93 (09-13-21 @ 08:22) (80 - 93)  BP: 120/75 (09-13-21 @ 08:22) (117/79 - 120/75)  BP(mean): --  RR: 18 (09-13-21 @ 08:22) (18 - 18)  SpO2: 97% (09-13-21 @ 08:22) (97% - 99%)

## 2021-09-14 NOTE — BH INPATIENT PSYCHIATRY PROGRESS NOTE - NSBHMSEMUSCLE_PSY_A_CORE
Other
Normal muscle tone/strength
Other
Other
Normal muscle tone/strength
Normal muscle tone/strength

## 2021-09-14 NOTE — BH INPATIENT PSYCHIATRY PROGRESS NOTE - NSBHINPTBILLING_PSY_ALL_CORE
[id number] - Inpatient Moderate Complexity
99443 - Inpatient High Complexity
[id number] - Inpatient Low Complexity
54872 - Inpatient High Complexity
21254 - Inpatient Moderate Complexity
99880 - Inpatient High Complexity

## 2021-09-14 NOTE — BH INPATIENT PSYCHIATRY PROGRESS NOTE - NSBHASSESSSUMMFT_PSY_ALL_CORE
30y/o , domiciled, employed [place] female with no pertinent medical diagnoses. Psychiatric hx significant for 2 prior hospitalizations (last [facility] 8/2021), diagnoses including MDD with psychosis, Brief psychiatric disorder, Anxiety d/o. Patient presents to ED complaining of worsening anxiety, panic and SI with plan to jump out of her apartment window.     Pt familiar to writer and staff from prior admission. She reports after being stabilized last month (during admission) on Zyprexa and zoloft she was doing fairly well, engaging with others, shopping, eating and sleeping well and anticipating going back to work and to her life. 6 days ago she reports experiencing worsening depression, panic attacks, anxiety coupled with nausea, blurry vision, sweating and urinary frequency which she describes as 'urinary incontinence' when she thinks about her job. She told her outpt psychiatrist, but nothing was done about this. She states that when she sees an email from her job she feels 'traumatized' and describes her most recent psychotic episode as traumatizing to her. Also feels that her interactions with her colleagues is somewhat harassing. Her appetite has been poor and she feels that she may have lost some weight within the last several days. Sleep has been unremarkable. She denies any psychotic symptoms since her first hospitalization, but feels that her 'mind is blocked.'     She began having suicidal thoughts 2 days ago with plan to jump out of the windows of her apartment building. Instead she recalled that she had her Safety plan from her most recent admission and attempted to her 'coping skills' and also called the suicide hotline. She did not friend the person on the call to be helpful so she came back to the hospital for admission. She denied any intent or desire to die, is future oriented, wanting to have a family with her wife and continue onwards with her profession. Gets very tearful at the idea that her illness may negatively impact her job and her license. She states that she feels that she is experiencing PTSD like symptoms, noted to be focused on Prazosin, though denies having nightmare related sxs.    -Since admission zoloft has been increased from 75mg to 100mg   -Psychology has also met with pt to begin sessions  -Pt beginning to split with staff
per previous: "32y/o , domiciled, employed [place] female with no pertinent medical diagnoses. Psychiatric hx significant for 2 prior hospitalizations (last [facility] 8/2021), diagnoses including MDD with psychosis, Brief psychiatric disorder, Anxiety d/o. Patient presents to ED complaining of worsening anxiety, panic and SI with plan to jump out of her apartment window.     Pt familiar to writer and staff from prior admission. She reports after being stabilized last month (during admission) on Zyprexa and zoloft she was doing fairly well, engaging with others, shopping, eating and sleeping well and anticipating going back to work and to her life. 6 days ago she reports experiencing worsening depression, panic attacks, anxiety coupled with nausea, blurry vision, sweating and urinary frequency which she describes as 'urinary incontinence' when she thinks about her job. She told her outpt psychiatrist, but nothing was done about this. She states that when she sees an email from her job she feels 'traumatized' and describes her most recent psychotic episode as traumatizing to her. Also feels that her interactions with her colleagues is somewhat harassing. Her appetite has been poor and she feels that she may have lost some weight within the last several days. Sleep has been unremarkable. She denies any psychotic symptoms since her first hospitalization, but feels that her 'mind is blocked.'     She began having suicidal thoughts 2 days ago with plan to jump out of the windows of her apartment building. Instead she recalled that she had her Safety plan from her most recent admission and attempted to her 'coping skills' and also called the suicide hotline. She did not friend the person on the call to be helpful so she came back to the hospital for admission. She denied any intent or desire to die, is future oriented, wanting to have a family with her wife and continue onwards with her profession. Gets very tearful at the idea that her illness may negatively impact her job and her license. She states that she feels that she is experiencing PTSD like symptoms, noted to be focused on Prazosin, though denies having nightmare related sxs."    -C/w zoloft 100mg   -Psychology has also met with pt to begin sessions  -Pt beginning to split with staff
per previous: "32y/o , domiciled, employed [place] female with no pertinent medical diagnoses. Psychiatric hx significant for 2 prior hospitalizations (last [facility] 8/2021), diagnoses including MDD with psychosis, Brief psychiatric disorder, Anxiety d/o. Patient presents to ED complaining of worsening anxiety, panic and SI with plan to jump out of her apartment window.     Pt familiar to writer and staff from prior admission. She reports after being stabilized last month (during admission) on Zyprexa and zoloft she was doing fairly well, engaging with others, shopping, eating and sleeping well and anticipating going back to work and to her life. 6 days ago she reports experiencing worsening depression, panic attacks, anxiety coupled with nausea, blurry vision, sweating and urinary frequency which she describes as 'urinary incontinence' when she thinks about her job. She told her outpt psychiatrist, but nothing was done about this. She states that when she sees an email from her job she feels 'traumatized' and describes her most recent psychotic episode as traumatizing to her. Also feels that her interactions with her colleagues is somewhat harassing. Her appetite has been poor and she feels that she may have lost some weight within the last several days. Sleep has been unremarkable. She denies any psychotic symptoms since her first hospitalization, but feels that her 'mind is blocked.'     She began having suicidal thoughts 2 days prior to admission with plan to jump out of the windows of her apartment building. Instead she recalled that she had her Safety plan from her most recent admission and attempted to her 'coping skills' and also called the suicide hotline. She did not friend the person on the call to be helpful so she came back to the hospital for admission. She denied any intent or desire to die, is future oriented, wanting to have a family with her wife and continue onwards with her profession. Gets very tearful at the idea that her illness may negatively impact her job and her license. She states that she feels that she is experiencing PTSD like symptoms, noted to be focused on Prazosin, though denies having nightmare related sxs."    -C/w zoloft 100mg, zyprexa 10mg qhs, remeron 7.5mg qhs prn   -Psychology has also met with pt to begin sessions  
per previous: "30y/o , domiciled, employed [place] American female with no pertinent medical diagnoses. Psychiatric hx significant for 2 prior hospitalizations (last [facility] 8/2021), diagnoses including MDD with psychosis, Brief psychiatric disorder, Anxiety d/o. Patient presents to ED complaining of worsening anxiety, panic and SI with plan to jump out of her apartment window.     Pt familiar to writer and staff from prior admission. She reports after being stabilized last month (during admission) on Zyprexa and zoloft she was doing fairly well, engaging with others, shopping, eating and sleeping well and anticipating going back to work and to her life. 6 days ago she reports experiencing worsening depression, panic attacks, anxiety coupled with nausea, blurry vision, sweating and urinary frequency which she describes as 'urinary incontinence' when she thinks about her job. She told her outpt psychiatrist, but nothing was done about this. She states that when she sees an email from her job she feels 'traumatized' and describes her most recent psychotic episode as traumatizing to her. Also feels that her interactions with her colleagues is somewhat harassing. Her appetite has been poor and she feels that she may have lost some weight within the last several days. Sleep has been unremarkable. She denies any psychotic symptoms since her first hospitalization, but feels that her 'mind is blocked.'     She began having suicidal thoughts 2 days ago with plan to jump out of the windows of her apartment building. Instead she recalled that she had her Safety plan from her most recent admission and attempted to her 'coping skills' and also called the suicide hotline. She did not friend the person on the call to be helpful so she came back to the hospital for admission. She denied any intent or desire to die, is future oriented, wanting to have a family with her wife and continue onwards with her profession. Gets very tearful at the idea that her illness may negatively impact her job and her license. She states that she feels that she is experiencing PTSD like symptoms, noted to be focused on Prazosin, though denies having nightmare related sxs."    -C/w zoloft 100mg   -Psychology has also met with pt to begin sessions  -Pt beginning to split with staff

## 2021-09-14 NOTE — BH INPATIENT PSYCHIATRY PROGRESS NOTE - NSDCCRITERIA_PSY_ALL_CORE
Pt reporting decrease in anxiety  Pt denying suicidal ideation

## 2021-09-14 NOTE — BH INPATIENT PSYCHIATRY PROGRESS NOTE - NSTXPROBPOTSD_PSY_ALL_CORE
POST-TRAUMATIC STRESS

## 2021-09-14 NOTE — BH INPATIENT PSYCHIATRY PROGRESS NOTE - NSBHATTESTSEENBY_PSY_A_CORE
NP without Attending Psychiatrist
attending Psychiatrist without NP/Trainee
NP without Attending Psychiatrist

## 2021-09-14 NOTE — BH INPATIENT PSYCHIATRY PROGRESS NOTE - NSTXPOTSDGOAL_PSY_ALL_CORE
Will identify 1 adaptive coping skill to manage symptoms of PTSD
Will be able to learn and utilize relaxation and deep breathing skills to manage distress
Will be able to learn and utilize relaxation and deep breathing skills to manage distress
Will identify 1 adaptive coping skill to manage symptoms of PTSD

## 2021-09-14 NOTE — BH INPATIENT PSYCHIATRY PROGRESS NOTE - NSICDXBHSECONDARYDX_PSY_ALL_CORE
Post traumatic stress disorder   F43.10  Recurrent major depressive disorder, in partial remission   F33.41  Personality disorder, unspecified   F60.9  
Post traumatic stress disorder   F43.10  Recurrent major depressive disorder, in partial remission   F33.41  
Post traumatic stress disorder   F43.10  Recurrent major depressive disorder, in partial remission   F33.41  
Post traumatic stress disorder   F43.10  Recurrent major depressive disorder, in partial remission   F33.41  Personality disorder, unspecified   F60.9  

## 2021-09-14 NOTE — BH INPATIENT PSYCHIATRY PROGRESS NOTE - NSBHFUPINTERVALHXFT_PSY_A_CORE
Patient visible on the unit, pleasant upon approach, states that she felt dizzy this morning, unsure what this could be due to. Has been compliant with all medications, tolerating all well. States that sleep was poor last night due to anxiety related to upcoming discharge, however feels better compared to presentation at time of admission. Denies si/hi, a/v h or paranoid ideation. States that she is still concerned about going back to her job, job is not at risk and she is agreeable to a php. Mood is 'better' affect is slightly constricted.

## 2021-09-14 NOTE — BH INPATIENT PSYCHIATRY PROGRESS NOTE - PRN MEDS
MEDICATIONS  (PRN):  acetaminophen   Tablet .. 650 milliGRAM(s) Oral every 6 hours PRN Moderate Pain (4 - 6)  aluminum hydroxide/magnesium hydroxide/simethicone Suspension 30 milliLiter(s) Oral every 6 hours PRN Dyspepsia  LORazepam     Tablet 2 milliGRAM(s) Oral every 6 hours PRN severe anxiety  
MEDICATIONS  (PRN):  acetaminophen   Tablet .. 650 milliGRAM(s) Oral every 6 hours PRN Moderate Pain (4 - 6)  aluminum hydroxide/magnesium hydroxide/simethicone Suspension 30 milliLiter(s) Oral every 6 hours PRN Dyspepsia  ibuprofen  Tablet. 600 milliGRAM(s) Oral every 8 hours PRN Severe Pain (7 - 10)  LORazepam     Tablet 2 milliGRAM(s) Oral every 6 hours PRN severe anxiety  
MEDICATIONS  (PRN):  acetaminophen   Tablet .. 650 milliGRAM(s) Oral every 6 hours PRN Moderate Pain (4 - 6)  aluminum hydroxide/magnesium hydroxide/simethicone Suspension 30 milliLiter(s) Oral every 6 hours PRN Dyspepsia  LORazepam     Tablet 2 milliGRAM(s) Oral every 6 hours PRN severe anxiety  
MEDICATIONS  (PRN):  acetaminophen   Tablet .. 650 milliGRAM(s) Oral every 6 hours PRN Moderate Pain (4 - 6)  aluminum hydroxide/magnesium hydroxide/simethicone Suspension 30 milliLiter(s) Oral every 6 hours PRN Dyspepsia  ibuprofen  Tablet. 600 milliGRAM(s) Oral every 8 hours PRN Severe Pain (7 - 10)  LORazepam     Tablet 2 milliGRAM(s) Oral every 6 hours PRN severe anxiety

## 2021-09-15 VITALS
HEART RATE: 88 BPM | OXYGEN SATURATION: 100 % | DIASTOLIC BLOOD PRESSURE: 80 MMHG | RESPIRATION RATE: 18 BRPM | TEMPERATURE: 98 F | SYSTOLIC BLOOD PRESSURE: 126 MMHG

## 2021-09-15 PROCEDURE — 80053 COMPREHEN METABOLIC PANEL: CPT

## 2021-09-15 PROCEDURE — 84702 CHORIONIC GONADOTROPIN TEST: CPT

## 2021-09-15 PROCEDURE — 80061 LIPID PANEL: CPT

## 2021-09-15 PROCEDURE — 99285 EMERGENCY DEPT VISIT HI MDM: CPT | Mod: 25

## 2021-09-15 PROCEDURE — 84443 ASSAY THYROID STIM HORMONE: CPT

## 2021-09-15 PROCEDURE — 99238 HOSP IP/OBS DSCHRG MGMT 30/<: CPT

## 2021-09-15 PROCEDURE — 81025 URINE PREGNANCY TEST: CPT

## 2021-09-15 PROCEDURE — 80307 DRUG TEST PRSMV CHEM ANLYZR: CPT

## 2021-09-15 PROCEDURE — 36415 COLL VENOUS BLD VENIPUNCTURE: CPT

## 2021-09-15 PROCEDURE — 83036 HEMOGLOBIN GLYCOSYLATED A1C: CPT

## 2021-09-15 PROCEDURE — 81001 URINALYSIS AUTO W/SCOPE: CPT

## 2021-09-15 PROCEDURE — 87635 SARS-COV-2 COVID-19 AMP PRB: CPT

## 2021-09-15 PROCEDURE — 85025 COMPLETE CBC W/AUTO DIFF WBC: CPT

## 2021-09-15 RX ORDER — SERTRALINE 25 MG/1
1 TABLET, FILM COATED ORAL
Qty: 0 | Refills: 0 | DISCHARGE
Start: 2021-09-15

## 2021-09-15 RX ORDER — OLANZAPINE 15 MG/1
1 TABLET, FILM COATED ORAL
Qty: 0 | Refills: 0 | DISCHARGE
Start: 2021-09-15

## 2021-09-15 RX ORDER — PANTOPRAZOLE SODIUM 20 MG/1
1 TABLET, DELAYED RELEASE ORAL
Qty: 14 | Refills: 0
Start: 2021-09-15 | End: 2021-09-28

## 2021-09-15 RX ORDER — PANTOPRAZOLE SODIUM 20 MG/1
1 TABLET, DELAYED RELEASE ORAL
Qty: 0 | Refills: 0 | DISCHARGE
Start: 2021-09-15

## 2021-09-15 RX ORDER — SERTRALINE 25 MG/1
1 TABLET, FILM COATED ORAL
Qty: 7 | Refills: 0
Start: 2021-09-15 | End: 2021-09-21

## 2021-09-15 RX ORDER — OLANZAPINE 15 MG/1
1 TABLET, FILM COATED ORAL
Qty: 7 | Refills: 0
Start: 2021-09-15 | End: 2021-09-21

## 2021-09-15 RX ADMIN — SERTRALINE 100 MILLIGRAM(S): 25 TABLET, FILM COATED ORAL at 10:09

## 2021-09-15 RX ADMIN — PANTOPRAZOLE SODIUM 40 MILLIGRAM(S): 20 TABLET, DELAYED RELEASE ORAL at 07:20

## 2021-09-15 RX ADMIN — Medication 50 MILLIGRAM(S): at 10:09

## 2021-09-15 NOTE — BH TREATMENT PLAN - NSTXDEPRESINTERSW_PSY_ALL_CORE
Liasion with family and collateral providers as needed and discharge planning.
Liasion with family and collateral providers as needed and discharge planning.

## 2021-09-15 NOTE — BH INPATIENT PSYCHIATRY DISCHARGE NOTE - NSDCMRMEDTOKEN_GEN_ALL_CORE_FT
mirtazapine 7.5 mg oral tablet: 1 tab(s) orally once a day (at bedtime), As needed, insomnia  OLANZapine 10 mg oral tablet: 1 tab(s) orally once a day (at bedtime)  pantoprazole 40 mg oral delayed release tablet: 1 tab(s) orally once a day (before a meal)  sertraline 50 mg oral tablet: 1 tab(s) orally once a day

## 2021-09-15 NOTE — BH DISCHARGE NOTE NURSING/SOCIAL WORK/PSYCH REHAB - PATIENT PORTAL LINK FT
You can access the FollowMyHealth Patient Portal offered by [facility] by registering at the following website: [email or web address]. By joining [facility] FollowMyHealth portal, you will also be able to view your health information using other applications (apps) compatible with our system.

## 2021-09-15 NOTE — BH TREATMENT PLAN - NSDCCRITERIA_PSY_ALL_CORE
Pt reporting decrease in anxiety  Pt denying suicidal ideation
Pt reporting decrease in anxiety  Pt denying suicidal ideation

## 2021-09-15 NOTE — BH TREATMENT PLAN - NSTXANXGOAL_PSY_ALL_CORE
Be able to participate in activities despite lingering anxiety/panic
Report a reduction in panic attacks and improving mood and confidence

## 2021-09-15 NOTE — BH DISCHARGE NOTE NURSING/SOCIAL WORK/PSYCH REHAB - NSDCSUICIDETHOUGHTS_PSY_ALL_CORE
No
decreased balance during turns/losing balance
laparoscopic vertical sleeve gastrectomy intraop egd  fingerstick heparin on day of surgery

## 2021-09-15 NOTE — BH TREATMENT PLAN - NSTXPOTSDGOAL_PSY_ALL_CORE
Will identify 1 adaptive coping skill to manage symptoms of PTSD
Will be able to learn and utilize relaxation and deep breathing skills to manage distress

## 2021-09-15 NOTE — BH DISCHARGE NOTE NURSING/SOCIAL WORK/PSYCH REHAB - NSCDUDCCRISIS_PSY_A_CORE
[facility]  1 [phone] [facility]-WELL ([phone])  Text "WELL" to [phone]  Website: [email or web address] 1 (049) 791-MNYS (6130) Website: www.safehorizon.org/.National Suicide Prevention Lifeline 8 (741) 002-9252/.  [facility]  1 (481) LIFENET (827-7726)/.  [facility]  [phone] [place] [address] [phone]   Hours:  Monday through Friday from 9 AM to 3 PM/.  [employer] - [facility] Crisis Line  5 [phone], Option 1

## 2021-09-15 NOTE — BH TREATMENT PLAN - NSTXPLANTHERAPYSESSIONSFT_PSY_ALL_CORE
09-08-21  Type of therapy: Music therapy  Type of session: Group  Level of patient participation: Attentive,Engaged,Participates  Duration of participation: 45 minutes  Therapy conducted by: Psych rehab  Therapy Summary: Pt. attended music therapy for the full duration and participated fully and appropriately.  Pt. was highly engaged in music-making throughout the session.  Pt. played several songs on the guitar and sang for the group.  Pt. also played tambourine while the group sang other songs together.  Pt. discussed with the group how she enjoys playing the guitar.  Pt. appeared with brighter affect when she was engaged in music-making.

## 2021-09-15 NOTE — BH INPATIENT PSYCHIATRY DISCHARGE NOTE - NSDCCPCAREPLAN_GEN_ALL_CORE_FT
PRINCIPAL DISCHARGE DIAGNOSIS  Diagnosis: Recurrent severe major depressive disorder with anxiety  Assessment and Plan of Treatment: Continue medication regimen and psychotherapy

## 2021-09-15 NOTE — BH INPATIENT PSYCHIATRY DISCHARGE NOTE - HOSPITAL COURSE
Shortly after admission to unit, pt reported decrease in anxiety as well as decrease in somatic complaints with addition of vistaril 50mg bid. She was visible throughout admission, quickly immersing self in unit activities, frequently attending multiple groups and fully participating. Interactions with staff and other patients was appropriate. Pt's wife consistently visited patient and maintained contact with team throughout admission. Patient did continue to report si for several days following admission which caused her to endorse ongoing depressive symptoms with worry and concern regarding her future and her ability to go back to her work. She was agreeable to titration of zoloft to 100mg daily to address address symptoms. Increase was tolerated well without issue. She began to report improvement in depressive symptoms with si being resolved. She began to talk about possibly returning to [place] to spend time with family there and receive aftercare treatment. After much discussion, pt was agreeable to more intensive aftercare treatment such as a partial hospital program. Wife was agreeable with plan. Shortly before discharge she endorse spike in anxiety (at thought of discharge) as well as accompanying somatic complaints that she was able to verbalize was due to her anxiety. However she was discharged focused and requested to be discharge once aftercare appointment was attained.   During hospitalization there was some evidence of personality pathology evidenced by splitting amongst treatment providers. Additionally pt seemed to appreciate level of attention given to her by her wife during her admission. Atarax was discontinued per pt's request as she felt that it was causing her to become dizzy.   Safety plan and CAM was completed prior to discharge and pt denied thoughts of death, suicide or self harm. Was future oriented and able to verbalize sources of support in event of crisis.

## 2021-09-15 NOTE — BH TREATMENT PLAN - NSBHPRIMARYDX_PSY_ALL_CORE
Recurrent major depressive disorder, in partial remission    
Recurrent major depressive disorder, in partial remission

## 2021-09-15 NOTE — BH TREATMENT PLAN - NSTXDEPRESINTERPR_PSY_ALL_CORE
Pt will be invited and encouraged to attend all offered self selected groups
Pt will continue to be invited and encouraged to attend all offered self selected groups

## 2021-09-15 NOTE — BH TREATMENT PLAN - NSPTSTATEDGOAL_PSY_ALL_CORE
Patient would like to be able to go back to work and be stable
Patient would like to be able to go back to work and be stable

## 2021-09-15 NOTE — BH DISCHARGE NOTE NURSING/SOCIAL WORK/PSYCH REHAB - NSDCADDINFO1FT_PSY_ALL_CORE
Remote intake on Monday, 9/20, at 10am. She will be called at the time of the intake and provided a Zoom ID to join the session. The intake may take up to 3 hour

## 2021-09-15 NOTE — BH DISCHARGE NOTE NURSING/SOCIAL WORK/PSYCH REHAB - NSDCPRGOAL_PSY_ALL_CORE
Pt. has demonstrated gains during her admission.  Pt. has appeared brighter, and has been more engaged and interactive.  Pt. has been social with peers, well-related and supportive of others in groups.  Pt. has been expressive in groups and has endorsed a sense of improvement.  Pt. completed a safety plan and has endorsed readiness for discharge.

## 2021-09-15 NOTE — BH INPATIENT PSYCHIATRY DISCHARGE NOTE - HPI (INCLUDE ILLNESS QUALITY, SEVERITY, DURATION, TIMING, CONTEXT, MODIFYING FACTORS, ASSOCIATED SIGNS AND SYMPTOMS)
30y/o , domiciled, employed [place] female with no pertinent medical diagnoses. Psychiatric hx significant for 2 prior hospitalizations (last [facility] 8/2021), diagnoses including MDD with psychosis, Brief psychiatric disorder, Anxiety d/o. Patient presents to ED complaining of worsening anxiety, panic and SI with plan to jump out of her apartment window.     Pt familiar to writer and staff from prior admission. She reports after being stabilized last month (during admission) on Zyprexa and zoloft she was doing fairly well, engaging with others, shopping, eating and sleeping well and anticipating going back to work and to her life. 6 days ago she reports experiencing worsening depression, panic attacks, anxiety coupled with nausea, blurry vision, sweating and urinary frequency which she describes as 'urinary incontinence' when she thinks about her job. She told her outpt psychiatrist, but nothing was done about this. She states that when she sees an email from her job she feels 'traumatized' and describes her most recent psychotic episode as traumatizing to her. Also feels that her interactions with her colleagues is somewhat harassing. Her appetite has been poor and she feels that she may have lost some weight within the last several days. Sleep has been unremarkable. She denies any psychotic symptoms since her first hospitalization, but feels that her 'mind is blocked.'     She began having suicidal thoughts 2 days ago with plan to jump out of the windows of her apartment building. Instead she recalled that she had her Safety plan from her most recent admission and attempted to her 'coping skills' and also called the suicide hotline. She did not friend the person on the call to be helpful so she came back to the hospital for admission. She denied any intent or desire to die, is future oriented, wanting to have a family with her wife and continue onwards with her profession. Gets very tearful at the idea that her illness may negatively impact her job and her license. She states that she feels that she is experiencing PTSD like symptoms, noted to be focused on Prazosin, though denies having nightmare related sxs.

## 2021-09-15 NOTE — BH DISCHARGE NOTE NURSING/SOCIAL WORK/PSYCH REHAB - 
ADDITIONAL INFORMATION
Patient reports that she received [employer] in the [facility] [place].

## 2021-09-15 NOTE — BH TREATMENT PLAN - NSTXPATIENTPARTICIPATE_PSY_ALL_CORE
Patient participated in identification of needs/problems/goals for treatment/Patient participated in defining interventions/Patient participated in development of after care plan
Patient participated in development of after care plan

## 2021-09-15 NOTE — BH INPATIENT PSYCHIATRY DISCHARGE NOTE - NSBHMETABOLIC_PSY_ALL_CORE_FT
BMI: BMI (kg/m2): 28.3 (09-07-21 @ 03:42)  HbA1c: A1C with Estimated Average Glucose Result: 4.9 % (09-07-21 @ 10:31)    Glucose:   BP: 124/77 (09-14-21 @ 17:11) (117/79 - 161/78)  Lipid Panel: Date/Time: 09-08-21 @ 08:24  Cholesterol, Serum: 184  Direct LDL: --  HDL Cholesterol, Serum: 61  Total Cholesterol/HDL Ration Measurement: --  Triglycerides, Serum: 77

## 2021-09-20 DIAGNOSIS — Z79.899 OTHER LONG TERM (CURRENT) DRUG THERAPY: ICD-10-CM

## 2021-09-20 DIAGNOSIS — K29.70 GASTRITIS, UNSPECIFIED, WITHOUT BLEEDING: ICD-10-CM

## 2021-09-20 DIAGNOSIS — F41.9 ANXIETY DISORDER, UNSPECIFIED: ICD-10-CM

## 2021-09-20 DIAGNOSIS — R45.851 SUICIDAL IDEATIONS: ICD-10-CM

## 2021-09-20 DIAGNOSIS — Z81.8 FAMILY HISTORY OF OTHER MENTAL AND BEHAVIORAL DISORDERS: ICD-10-CM

## 2021-09-20 DIAGNOSIS — F33.41 MAJOR DEPRESSIVE DISORDER, RECURRENT, IN PARTIAL REMISSION: ICD-10-CM

## 2021-09-20 DIAGNOSIS — Z88.0 ALLERGY STATUS TO PENICILLIN: ICD-10-CM

## 2021-09-20 DIAGNOSIS — F43.10 POST-TRAUMATIC STRESS DISORDER, UNSPECIFIED: ICD-10-CM

## 2021-09-20 DIAGNOSIS — F60.89 OTHER SPECIFIC PERSONALITY DISORDERS: ICD-10-CM

## 2023-11-10 NOTE — ED ADULT TRIAGE NOTE - CHIEF COMPLAINT QUOTE
-- DO NOT REPLY / DO NOT REPLY ALL --  -- Message is from [facility] ([facility]) --    General Patient Message: patient has called to reschedule her appointment with Dr Sloan.  She can only come at 3:30 or later because that is when her ride will be available.  She is looking for something yet in November.  Nothing available.  Writer unable to further assist.  She would not make an appointment with writer and then have me add her to the wait list.      Alternative phone number: no    Can a detailed message be left? Yes    Message Turnaround: [place]:    Refer to site's KB page for routing instructions    Please give this turnaround time to the caller:   \"You can expect to receive a response 2-3 business days after your provider's clinical team reviews the message\"               Requesting psych evaluation and endorses SI without plan. States she had a "psychotic break" about a month ago with hallucinations, agitation, poor sleep, "mind racing," was admitted to [facility] for evaluation and management, and since the break has had thoughts of harming herself without plan. Reports her medications for management have since changed since her admission to [facility] (phone consults), and is requesting to be evaluated in person due to worsening agitation and restlessness. Denies HI. 1:1 initiated in triage.

## 2024-11-08 NOTE — ED BEHAVIORAL HEALTH ASSESSMENT NOTE - ABNORMAL MOVEMENTS
Art line positional and not correlating with BP cuff. Per Blake Vizcaino, NP RN to titrate pressors off of the cuff.    No abnormal movements